# Patient Record
Sex: MALE | Race: WHITE | Employment: STUDENT | URBAN - METROPOLITAN AREA
[De-identification: names, ages, dates, MRNs, and addresses within clinical notes are randomized per-mention and may not be internally consistent; named-entity substitution may affect disease eponyms.]

---

## 2017-04-20 DIAGNOSIS — K50.811 CROHN'S DISEASE OF BOTH SMALL AND LARGE INTESTINE WITH RECTAL BLEEDING (H): ICD-10-CM

## 2017-04-20 NOTE — TELEPHONE ENCOUNTER
mesalamine      Last Written Prescription Date:  9-13-16  Last Fill Quantity: 240,   # refills: 3  Last Office Visit : 9-13-16  Future Office visit:  none    Creatinine   Date Value Ref Range Status   07/08/2013 1.03 (H) 0.50 - 1.00 mg/dL Final   ]    Kathleen M Doege RN

## 2017-04-21 RX ORDER — MESALAMINE 500 MG/1
2000 CAPSULE, EXTENDED RELEASE ORAL 2 TIMES DAILY
Qty: 240 CAPSULE | Refills: 3 | Status: SHIPPED | OUTPATIENT
Start: 2017-04-21 | End: 2018-09-05

## 2017-07-07 ENCOUNTER — CARE COORDINATION (OUTPATIENT)
Dept: GASTROENTEROLOGY | Facility: CLINIC | Age: 22
End: 2017-07-07

## 2017-07-07 NOTE — PROGRESS NOTES
Called pt in response from in basket message from call center.  Called to discuss symptoms. Pt has not been seen since September 2016.  Pt cancelled his appt in December due to college finals.  Said he has had flares. When asked to describe flares,  Said last one was a couple of weeks ago.  Diarrhea, mucus and blood noted around the stools.  Taking pentasa as prescribed.  Started takingr rowasa last week. Dr. Whipple had a last minute cancellation for Monday.  Offered the pt appt on Monday.  Pt in agreement.       followup with Dr. Whipple but does not want to wait until October / next available to be seen.     PT is requesting a call to discuss scheduling options.   PT can be reached at [phone]

## 2017-07-09 ASSESSMENT — ENCOUNTER SYMPTOMS
CONSTIPATION: 0
BLOOD IN STOOL: 1
HEARTBURN: 1
JAUNDICE: 0
RECTAL PAIN: 0
NAUSEA: 0
DIARRHEA: 1
BOWEL INCONTINENCE: 0
RECTAL BLEEDING: 0
BLOATING: 0
VOMITING: 0
ABDOMINAL PAIN: 1

## 2017-07-10 ENCOUNTER — TELEPHONE (OUTPATIENT)
Dept: GASTROENTEROLOGY | Facility: CLINIC | Age: 22
End: 2017-07-10

## 2017-07-10 ENCOUNTER — OFFICE VISIT (OUTPATIENT)
Dept: GASTROENTEROLOGY | Facility: CLINIC | Age: 22
End: 2017-07-10

## 2017-07-10 VITALS
BODY MASS INDEX: 24.82 KG/M2 | SYSTOLIC BLOOD PRESSURE: 128 MMHG | WEIGHT: 167.6 LBS | HEART RATE: 87 BPM | DIASTOLIC BLOOD PRESSURE: 71 MMHG | OXYGEN SATURATION: 98 % | HEIGHT: 69 IN | TEMPERATURE: 98.2 F

## 2017-07-10 DIAGNOSIS — K50.811 CROHN'S DISEASE OF BOTH SMALL AND LARGE INTESTINE WITH RECTAL BLEEDING (H): Primary | ICD-10-CM

## 2017-07-10 ASSESSMENT — PAIN SCALES - GENERAL: PAINLEVEL: NO PAIN (0)

## 2017-07-10 NOTE — MR AVS SNAPSHOT
After Visit Summary   7/10/2017    Ferdinand Anglin    MRN: [id number]           Patient Information     Date Of Birth          1995        Visit Information        Provider Department      7/10/2017 11:20 AM Estrada Whipple MD [facility] Gastroenterology and IBD        Today's Diagnoses     Crohn's disease of both small and large intestine with rectal bleeding (H)    -  1      Care Instructions    Good to see you again    Get labs  Lab tests today at the 1st floor -- you will be notified of results by letter or my chart message in 7-10 days.  You will receive a phone call if more urgent follow up is needed.      Schedule flexible sigmoidoscopy without any sedation    You are scheduled  On   [facility]   [address] [place] [address]   Instructions will be sent out via email   July 19   Check in time is 1 pm  You will be contacted by the pre assessment nurse     Continue the Pentasa and rowasa enemas      Follow up in 2-3 months Ari Anders  October 6 th   Check in 745   Ari Anders  4th floor   Surgery Clinic   [address] [facility].     For questions regarding your care Monday through Friday, contact the RN GI care coordinator,  Call Maude Nathan at  [phone] option 3 and leave a voicemail. Your call will be  returned same day, or if consultation is needed with the provider, it may be following business day - or you may send a My Chart message.    For medication refills (prescribed by the GI clinic), contact your pharmacy.    For appointment rescheduling/cancellation, contact [phone]     After hours, or if you have an immediate GI concern and cannot wait for a return call, contact the GI Fellow at [phone] and select option #4.              Follow-ups after your visit        Additional Services     GASTROENTEROLOGY ADULT REF PROCEDURE ONLY       Last Lab Result: Creatinine (mg/dL)       Date                     Value                 07/08/2013               1.03 (H)          ----------  Body mass index is 24.54 kg/(m^2).     Needed:  No  Language:  English    Patient will be contacted to schedule procedure.     Please be aware that coverage of these services is subject to the terms and limitations of your health insurance plan.  Call member services at your health plan with any benefit or coverage questions.  Any procedures must be performed at a [place] facility OR coordinated by your clinic's referral office.    Please bring the following with you to your appointment:    (1) Any X-Rays, CTs or MRIs which have been performed.  Contact the facility where they were done to arrange for  prior to your scheduled appointment.    (2) List of current medications   (3) This referral request   (4) Any documents/labs given to you for this referral                  Your next 10 appointments already scheduled     Jul 19, 2017  2:00 PM CDT   Flexible Sigmoidoscopy with Estrada Whipple MD   [facility]    [address] [place], [place] [id number] [phone]              Future tests that were ordered for you today     Open Future Orders        Priority Expected Expires Ordered    Thiopurine Methyltransferase RBC [DRS6875] Routine 7/10/2017 9/8/2017 7/10/2017    CBC with platelets differential [OMJ345] Routine 7/10/2017 9/8/2017 7/10/2017    Hepatic panel [LAB20] Routine 7/10/2017 9/8/2017 7/10/2017    CRP inflammation [HEN8950] Routine 7/10/2017 9/8/2017 7/10/2017    Erythrocyte sedimentation rate auto [JMZ934] Routine 7/10/2017 9/8/2017 7/10/2017    Basic metabolic panel [LAB15] Routine 7/10/2017 9/8/2017 7/10/2017            Who to contact     Please call your clinic at [phone] to:    Ask questions about your health    Make or cancel appointments    Discuss your medicines    Learn about your test results    Speak to your doctor   If you have compliments or concerns about an experience at your clinic,  "or if you wish to file a complaint, please contact [facility] Patient Relations at [phone] or email us at [email or web address]         Additional Information About Your Visit        CEYXharAyudarum Information     Quick TV gives you secure access to your electronic health record. If you see a primary care provider, you can also send messages to your care team and make appointments. If you have questions, please call your primary care clinic.  If you do not have a primary care provider, please call [phone] and they will assist you.      Quick TV is an electronic gateway that provides easy, online access to your medical records. With Quick TV, you can request a clinic appointment, read your test results, renew a prescription or communicate with your care team.     To access your existing account, please contact your [facility] Clinic or call [phone] for assistance.        Care EveryWhere ID     This is your Care EveryWhere ID. This could be used by other organizations to access your [facility] medical records  [id number]        Your Vitals Were     Pulse Temperature Height Pulse Oximetry BMI (Body Mass Index)       87 98.2  F (36.8  C) 1.76 m (5' 9.29\") 98% 24.54 kg/m2        Blood Pressure from Last 3 Encounters:   07/10/17 128/71   09/13/16 143/87   07/08/13 (!) 126/96    Weight from Last 3 Encounters:   07/10/17 76 kg (167 lb 9.6 oz)   10/14/16 74.8 kg (165 lb) (65 %)*   09/13/16 75.4 kg (166 lb 3.2 oz) (67 %)*     * Growth percentiles are based on CDC 2-20 Years data.              We Performed the Following     GASTROENTEROLOGY ADULT REF PROCEDURE ONLY        Primary Care Provider Office Phone # Fax #    Sandi Holloway [phone]       [facility]  [address] [place] [address]        Equal Access to Services     RUPERT DUNBAR AH: Hadii aad ku hadasho Soomaali, waaxda luqadaha, qaybta kaalmada " denise santosozzyferny virgen'aan ah. So [facility] [phone].    ATENCIÓN: Si katila brynn, tiene a roberto disposición servicios gratuitos de asistencia lingüística. Imelda al [phone].    We comply with applicable federal civil rights laws and [place] laws. We do not discriminate on the basis of race, color, national origin, age, disability sex, sexual orientation or gender identity.            Thank you!     Thank you for choosing [facility] GASTROENTEROLOGY AND IBD  for your care. Our goal is always to provide you with excellent care. Hearing back from our patients is one way we can continue to improve our services. Please take a few minutes to complete the written survey that you may receive in the mail after your visit with us. Thank you!             Your Updated Medication List - Protect others around you: Learn how to safely use, store and throw away your medicines at www.disposemymeds.org.          This list is accurate as of: 7/10/17 12:26 PM.  Always use your most recent med list.                   Brand Name Dispense Instructions for use Diagnosis    calcium carbonate 1250 MG tablet    OS-HARLAN 500 mg [place]. [place]     Take 2,000 mg by mouth 2 times daily        ENSURE COMPLETE SHAKE PO      Take  by mouth.        mesalamine 500 MG Cpcr CR capsule    PENTASA    240 capsule    Take 4 capsules (2,000 mg) by mouth 2 times daily    Crohn's disease of both small and large intestine with rectal bleeding (H)       ROWASA 4 G Kit     30 kit    4 grams in 60 mL once daily    Crohn's disease of both small and large intestine with rectal bleeding (H)       vitamin D 2000 UNITS Caps      Take 50,000 Units by mouth

## 2017-07-10 NOTE — NURSING NOTE
"Chief Complaint   Patient presents with     RECHECK     f/u       Vitals:    07/10/17 1135   BP: 128/71   BP Location: Left arm   Patient Position: Chair   Cuff Size: Adult Large   Pulse: 87   Temp: 98.2  F (36.8  C)   SpO2: 98%   Weight: 167 lb 9.6 oz   Height: 5' 9.29\"       Body mass index is 24.54 kg/(m^2).      Myla Anthony MA                          "

## 2017-07-10 NOTE — NURSING NOTE
Printed after visit summary given to pt along with verbal instructions.  Flex sig scheduled.  Follow up appt with Ari Hodges.

## 2017-07-10 NOTE — PATIENT INSTRUCTIONS
Good to see you again    Get labs  Lab tests today at the 1st floor -- you will be notified of results by letter or my chart message in 7-10 days.  You will receive a phone call if more urgent follow up is needed.      Schedule flexible sigmoidoscopy without any sedation    You are scheduled  On   [facility]   [address] [facility]   Instructions will be sent out via email   July 19   Check in time is 1 pm  You will be contacted by the pre assessment nurse     Continue the Pentasa and rowasa enemas      Follow up in 2-3 months Ari Anders  October 6 th   Check in 745   Ari Anders  4th floor   Surgery Clinic   [address] [facility].     For questions regarding your care Monday through Friday, contact the RN GI care coordinator,  Call Maude Nathan at  [phone] option 3 and leave a voicemail. Your call will be  returned same day, or if consultation is needed with the provider, it may be following business day - or you may send a My Chart message.    For medication refills (prescribed by the GI clinic), contact your pharmacy.    For appointment rescheduling/cancellation, contact [phone]     After hours, or if you have an immediate GI concern and cannot wait for a return call, contact the GI Fellow at [phone] and select option #4.

## 2017-07-10 NOTE — PROGRESS NOTES
OUTPATIENT GI CONSULT NOTE       REASON FOR VISIT:  Crohn's disease.       REFERRING PROVIDER:  Sandi Holloway MD.       CHIEF COMPLAINT:  Bloody diarrhea.       IBD HISTORY:   1.  Age at diagnosis:  He was diagnosed at age 8.   2.  Extent of disease:  Reports indicate that he had gastritis, as well as some duodenitis with granulomas in the duodenum.  He also had some ileitis and colitis.  The exact extent of involvement of the colon on diagnosis is not available, although his most recent colonoscopy in  showed some inflammation in the rectosigmoid. Follow up EGD showed no granulomas  3.  Disease phenotype:  Inflammatory.   4.  Perianal disease:  None known.   5.  Current Crohn's disease medications:  Pentasa 2000 mg PO BID. Rowasa enemas nightly.   6.  Prior IBD surgeries:  None.   7.  Prior IBD medications:  He has been on Pentasa in the past, anywhere from 500 b.i.d. to 2000 mg p.o. b.i.d.  He has been on-and-off prednisone over the years.  He recently tried Entocort with no improvement in his symptoms.  He had been on Humira in the past with some question of improvement; however, he and his family all deny that the Humira helped him at all.  That was back in .       Drug monitorin.  TPMT:  22.7 (WNL).   2.  6TGN/6MMPN levels:  None available.   3.  Biologic concentrations:  None available.       Today:  -doing ok. Did not follow up in December due to finals.  -still with significant anxiety about scopes. Thinks he may be able to try unsedated. He is nervous about anesthesia not the scope itself. Wants to think more about it. But is willing to do flex sig    -gets flare in symptoms every few weeks. When doing well has 2 formed BMs daily. With flare will get 6 BMs per day and eventually some blood. +Gas. Can always hold it. Not really urgent. Associated abd cramping. No accidents. No nocturnal stools.    -No EIM      REVIEW OF SYSTEMS:  A complete review of systems is performed.  Pertinent positives  "and negatives are as stated above in the HPI.  The remainder of a complete review of systems is unremarkable.       PAST MEDICAL HISTORY:  Crohn's disease as outlined above.       MEDICATIONS:   Current Outpatient Prescriptions   Medication     mesalamine (PENTASA) 500 MG CPCR CR capsule     calcium carbonate (OS-HARLAN 500 MG [place]. [place]) 500 MG tablet     Mesalamine-Cleanser (ROWASA) 4 G KIT     Cholecalciferol (VITAMIN D) 2000 UNITS CAPS     Nutritional Supplements (ENSURE COMPLETE SHAKE PO)     No current facility-administered medications for this visit.          SOCIAL HISTORY:  Reviewd. He does not smoke tobacco.  He has minimal use of marijuana, but none currently.  No alcohol use.  He is a sophomore in college, and he is a computer science and math major.  He is originally from [place], and his parents are with him today.  They all speak very good English.       PAST SURGICAL HISTORY:  He has had his adenoids taken out.  No abdominal surgeries.       FAMILY HISTORY:    -Father had a history of IBS and Parkinson's disease.    -Maternal grandmother was 80 when she had colon polyps.    -His maternal uncle also had colon cancer at the age of 54.       PHYSICAL EXAMINATION:   /71 (BP Location: Left arm, Patient Position: Chair, Cuff Size: Adult Large)  Pulse 87  Temp 98.2  F (36.8  C)  Ht 1.76 m (5' 9.29\")  Wt 76 kg (167 lb 9.6 oz)  SpO2 98%  BMI 24.54 kg/m2  GENERAL:  He is pleasant, in no acute distress.   HEENT:  Head is atraumatic, normocephalic.  Sclerae are anicteric without injection.  Oropharynx is clear with moist mucous membranes.   NECK:  Supple with no lymphadenopathy, no thyromegaly.   LUNGS:  Breathing comfortably  HEART:  Normal rate and rhythm.   ABDOMEN:  Soft, nontender and nondistended.   EXTREMITIES:  No clubbing, cyanosis or edema.   SKIN:  No evidence of rash.   JOINTS:  No evidence of synovitis.   NEUROLOGIC:  Awake, alert and oriented x3 with no focal deficits.       LABORATORY DATA: "    -labs reviewed from 9/2016. Leukocytosis on steroids. ALT at 56. Prot 9. Alb 4.3. Iron studies ok. Folate could not be run. B12 high. Hgb 19.2. Plt 372.    MRE  - no abnormalities    Immune to hep B      ASSESSMENT:  Mr. Anglin is a 21-year-old gentleman with a history of both upper and lower GI Crohn's disease, who is here today to establish care.      1.  Inflammatory bowel disease. Again we discussed need for endoscopic evaluation (EGD and full colonoscopy). Most recent colonoscopy 2007 showed recto-sigmoid inflammation which could be suggestive of UC but reportedly had granulomas in upper GI tract and had some ileal inflammation many years ago that would suggest Crohn's. MRE unremarkable. We need to re-stage disease to clarify diagnosis and determine if there is need for escalation of therapy. He is at least willing to do an unsedated flexible sigmoidoscopy. This is not ideal as I do want to evaluate the entire colon, the ileum and the upper GI tract but at least it will give us some evaluation of the mucosa. Depending on what we find we will decide if we need to push harder for more endoscopic evaluation vs escalation of therapy. We did discuss option of trying procedures without sedation and he will consider.   -flex sig  -labs today  -continue Pentasa 2000 mg BID and Rowasa enemas nightly for now  -follow up in 2-3 months with PA       2. Health care maintenance.  This was not discussed in depth with the patient.    -calcium/vitamin D daily. Will consider DEXA in the future  -recommend he be UTD on all age appropriate vaccines  -ok to get live vaccines for now if needed  -recommend yearly flu shot  -He should avoid tobacco and NSAIDs.    -We can discuss further need for skin checks in the future if he is ever put on maintenance immunosuppression      Thank you very much for the opportunity to take part in the care of this patient.  Please do not hesitate to call with questions.     Estrada Whipple,  MD    [facility]  Division of Gastroenterology, Hepatology and Nutrition    Answers for HPI/ROS submitted by the patient on 7/9/2017   General Symptoms: No  Skin Symptoms: No  HENT Symptoms: No  EYE SYMPTOMS: No  HEART SYMPTOMS: No  LUNG SYMPTOMS: No  INTESTINAL SYMPTOMS: Yes  URINARY SYMPTOMS: No  REPRODUCTIVE SYMPTOMS: No  SKELETAL SYMPTOMS: No  BLOOD SYMPTOMS: No  NERVOUS SYSTEM SYMPTOMS: No  MENTAL HEALTH SYMPTOMS: No  Heart burn or indigestion: Yes  Nausea: No  Vomiting: No  Abdominal pain: Yes  Bloating: No  Constipation: No  Diarrhea: Yes  Blood in stool: Yes  Black stools: No  Rectal or Anal pain: No  Fecal incontinence: No  Rectal bleeding: No  Yellowing of skin or eyes: No  Vomit with blood: No  Change in stools: Yes  Hemorrhoids: Yes

## 2017-07-10 NOTE — LETTER
7/10/2017       RE: Ferdinand Anglin  [address] [place] [address]     Dear Colleague,    Thank you for referring your patient, Ferdinand Anglin, to the [facility] GASTROENTEROLOGY AND IBD at [facility]. Please see a copy of my visit note below.    OUTPATIENT GI CONSULT NOTE       REASON FOR VISIT:  Crohn's disease.       REFERRING PROVIDER:  Sandi Holloway MD.       CHIEF COMPLAINT:  Bloody diarrhea.       IBD HISTORY:   1.  Age at diagnosis:  He was diagnosed at age 8.   2.  Extent of disease:  Reports indicate that he had gastritis, as well as some duodenitis with granulomas in the duodenum.  He also had some ileitis and colitis.  The exact extent of involvement of the colon on diagnosis is not available, although his most recent colonoscopy in  showed some inflammation in the rectosigmoid. Follow up EGD showed no granulomas  3.  Disease phenotype:  Inflammatory.   4.  Perianal disease:  None known.   5.  Current Crohn's disease medications:   Pentasa 2000 mg PO BID. Rowasa enemas nightly.   6.  Prior IBD surgeries:  None.   7.  Prior IBD medications:  He has been on Pentasa in the past, anywhere from 500 b.i.d. to 2000 mg p.o. b.i.d.  He has been on-and-off prednisone over the years.  He recently tried Entocort with no improvement in his symptoms.  He had been on Humira in the past with some question of improvement; however, he and his family all deny that the Humira helped him at all.  That was back in .       Drug monitorin.  TPMT:    22.7 (WNL).   2.  6TGN/6MMPN levels:  None available.   3.  Biologic concentrations:  None available.       Today:  -doing ok. Did not follow up in December due to finals.  -still with significant anxiety about scopes. Thinks he may be able to try unsedated. He is nervous about anesthesia not the scope itself. Wants to think more about it. But is willing to do flex sig    -gets flare in symptoms every few weeks. When  "doing well has 2 formed BMs daily. With flare will get 6 BMs per day and eventually some blood. +Gas. Can always hold it. Not really urgent. Associated abd cramping. No accidents. No nocturnal stools.    -No EIM      REVIEW OF SYSTEMS:  A complete review of systems is performed.  Pertinent positives and negatives are as stated above in the HPI.  The remainder of a complete review of systems is unremarkable.       PAST MEDICAL HISTORY:  Crohn's disease as outlined above.       MEDICATIONS:   Current Outpatient Prescriptions   Medication     mesalamine (PENTASA) 500 MG CPCR CR capsule     calcium carbonate (OS-HARLAN 500 MG [place]. [place]) 500 MG tablet     Mesalamine-Cleanser (ROWASA) 4 G KIT     Cholecalciferol (VITAMIN D) 2000 UNITS CAPS     Nutritional Supplements (ENSURE COMPLETE SHAKE PO)     No current facility-administered medications for this visit.      SOCIAL HISTORY:   Reviewd. He does not smoke tobacco.  He has minimal use of marijuana, but none currently.  No alcohol use.  He is a sophomore in college, and he is a computer science and math major.  He is originally from [place], and his parents are with him today.  They all speak very good English.       PAST SURGICAL HISTORY:  He has had his adenoids taken out.  No abdominal surgeries.       FAMILY HISTORY:    -Father had a history of IBS and Parkinson's disease.    -Maternal grandmother was 80 when she had colon polyps.    -His maternal uncle also had colon cancer at the age of 54.       PHYSICAL EXAMINATION:   /71 (BP Location: Left arm, Patient Position: Chair, Cuff Size: Adult Large)  Pulse 87  Temp 98.2  F (36.8  C)  Ht 1.76 m (5' 9.29\")  Wt 76 kg (167 lb 9.6 oz)  SpO2 98%  BMI 24.54 kg/m2  GENERAL:  He is pleasant, in no acute distress.   HEENT:  Head is atraumatic, normocephalic.  Sclerae are anicteric without injection.  Oropharynx is clear with moist mucous membranes.   NECK:  Supple with no lymphadenopathy, no thyromegaly.   LUNGS:   " Breathing comfortably  HEART:  Normal rate and rhythm.   ABDOMEN:  Soft, nontender and nondistended.   EXTREMITIES:  No clubbing, cyanosis or edema.   SKIN:  No evidence of rash.   JOINTS:  No evidence of synovitis.   NEUROLOGIC:  Awake, alert and oriented x3 with no focal deficits.       LABORATORY DATA:    -labs reviewed from 9/2016. Leukocytosis on steroids. ALT at 56. Prot 9. Alb 4.3. Iron studies ok. Folate could not be run. B12 high. Hgb 19.2. Plt 372.    MRE  - no abnormalities    Immune to hep B      ASSESSMENT:  Mr. Anglin is a 21-year-old gentleman with a history of both upper and lower GI Crohn's disease, who is here today to establish care.      1.  Inflammatory bowel disease. Again we discussed need for endoscopic evaluation (EGD and full colonoscopy). Most recent colonoscopy 2007 showed recto-sigmoid inflammation which could be suggestive of UC but reportedly had granulomas in upper GI tract and had some ileal inflammation many years ago that would suggest Crohn's. MRE unremarkable. We need to re-stage disease to clarify diagnosis and determine if there is need for escalation of therapy. He is at least willing to do an unsedated flexible sigmoidoscopy. This is not ideal as I do want to evaluate the entire colon, the ileum and the upper GI tract but at least it will give us some evaluation of the mucosa. Depending on what we find we will decide if we need to push harder for more endoscopic evaluation vs escalation of therapy. We did discuss option of trying procedures without sedation and he will consider.   -flex sig  -labs today  -continue Pentasa 2000 mg BID and Rowasa enemas nightly for now  -follow up in 2-3 months with PA       2. Health care maintenance.  This was not discussed in depth with the patient.    -calcium/vitamin D daily. Will consider DEXA in the future  -recommend he be UTD on all age appropriate vaccines  -ok to get live vaccines for now if needed  -recommend yearly flu shot  -He  should avoid tobacco and NSAIDs.    -We can discuss further need for skin checks in the future if he is ever put on maintenance immunosuppression      Thank you very much for the opportunity to take part in the care of this patient.  Please do not hesitate to call with questions.     Again, thank you for allowing me to participate in the care of your patient.      Sincerely,    Estrada Whipple MD

## 2017-07-12 ENCOUNTER — TELEPHONE (OUTPATIENT)
Dept: GASTROENTEROLOGY | Facility: OUTPATIENT CENTER | Age: 22
End: 2017-07-12

## 2017-07-12 DIAGNOSIS — K50.811 CROHN'S DISEASE OF BOTH SMALL AND LARGE INTESTINE WITH RECTAL BLEEDING (H): ICD-10-CM

## 2017-07-12 LAB
BASOPHILS # BLD AUTO: 0 10E9/L (ref 0–0.2)
BASOPHILS NFR BLD AUTO: 0.2 %
CRP SERPL-MCNC: <2.9 MG/L (ref 0–8)
DIFFERENTIAL METHOD BLD: NORMAL
EOSINOPHIL # BLD AUTO: 0.2 10E9/L (ref 0–0.7)
EOSINOPHIL NFR BLD AUTO: 3.6 %
ERYTHROCYTE [DISTWIDTH] IN BLOOD BY AUTOMATED COUNT: 12.3 % (ref 10–15)
ERYTHROCYTE [SEDIMENTATION RATE] IN BLOOD BY WESTERGREN METHOD: 7 MM/H (ref 0–15)
HCT VFR BLD AUTO: 50.7 % (ref 40–53)
HGB BLD-MCNC: 17.5 G/DL (ref 13.3–17.7)
LYMPHOCYTES # BLD AUTO: 2.4 10E9/L (ref 0.8–5.3)
LYMPHOCYTES NFR BLD AUTO: 38.6 %
MCH RBC QN AUTO: 31.8 PG (ref 26.5–33)
MCHC RBC AUTO-ENTMCNC: 34.5 G/DL (ref 31.5–36.5)
MCV RBC AUTO: 92 FL (ref 78–100)
MONOCYTES # BLD AUTO: 0.4 10E9/L (ref 0–1.3)
MONOCYTES NFR BLD AUTO: 6.9 %
NEUTROPHILS # BLD AUTO: 3.1 10E9/L (ref 1.6–8.3)
NEUTROPHILS NFR BLD AUTO: 50.7 %
PLATELET # BLD AUTO: 315 10E9/L (ref 150–450)
RBC # BLD AUTO: 5.51 10E12/L (ref 4.4–5.9)
WBC # BLD AUTO: 6.2 10E9/L (ref 4–11)

## 2017-07-12 PROCEDURE — 99000 SPECIMEN HANDLING OFFICE-LAB: CPT | Performed by: FAMILY MEDICINE

## 2017-07-12 PROCEDURE — 80048 BASIC METABOLIC PNL TOTAL CA: CPT | Performed by: FAMILY MEDICINE

## 2017-07-12 PROCEDURE — 85025 COMPLETE CBC W/AUTO DIFF WBC: CPT | Performed by: FAMILY MEDICINE

## 2017-07-12 PROCEDURE — 85652 RBC SED RATE AUTOMATED: CPT | Performed by: FAMILY MEDICINE

## 2017-07-12 PROCEDURE — 82657 ENZYME CELL ACTIVITY: CPT | Mod: 90 | Performed by: FAMILY MEDICINE

## 2017-07-12 PROCEDURE — 86140 C-REACTIVE PROTEIN: CPT | Performed by: FAMILY MEDICINE

## 2017-07-12 PROCEDURE — 36415 COLL VENOUS BLD VENIPUNCTURE: CPT | Performed by: FAMILY MEDICINE

## 2017-07-12 PROCEDURE — 80076 HEPATIC FUNCTION PANEL: CPT | Performed by: FAMILY MEDICINE

## 2017-07-12 NOTE — TELEPHONE ENCOUNTER
Patient taking any blood thinners ? no    Heart disease ? denies    Lung disease ? denies      Sleep apnea ? denies    Diabetic ? denies    Kidney disease ? denies    Dialysis ? n/a    Electronic implanted medical devices ? denies    Are you taking any narcotic pain medication ? no  What is your daily dosage ?    PTSD ? n/a    Prep instructions reviewed with patient ? Instructions reviewed. patient states he is not having sedation.    Pharmacy : n/a    Indication for procedure : IBS    Referring provider : Dr. Estrada Whipple

## 2017-07-13 LAB
ALBUMIN SERPL-MCNC: 4.2 G/DL (ref 3.4–5)
ALP SERPL-CCNC: 88 U/L (ref 40–150)
ALT SERPL W P-5'-P-CCNC: 29 U/L (ref 0–70)
ANION GAP SERPL CALCULATED.3IONS-SCNC: 9 MMOL/L (ref 3–14)
AST SERPL W P-5'-P-CCNC: 15 U/L (ref 0–45)
BILIRUB DIRECT SERPL-MCNC: 0.1 MG/DL (ref 0–0.2)
BILIRUB SERPL-MCNC: 0.7 MG/DL (ref 0.2–1.3)
BUN SERPL-MCNC: 18 MG/DL (ref 7–30)
CALCIUM SERPL-MCNC: 9.8 MG/DL (ref 8.5–10.1)
CHLORIDE SERPL-SCNC: 104 MMOL/L (ref 94–109)
CO2 SERPL-SCNC: 28 MMOL/L (ref 20–32)
CREAT SERPL-MCNC: 1.1 MG/DL (ref 0.66–1.25)
GFR SERPL CREATININE-BSD FRML MDRD: 85 ML/MIN/1.7M2
GLUCOSE SERPL-MCNC: 116 MG/DL (ref 70–99)
POTASSIUM SERPL-SCNC: 3.9 MMOL/L (ref 3.4–5.3)
PROT SERPL-MCNC: 8.1 G/DL (ref 6.8–8.8)
SODIUM SERPL-SCNC: 141 MMOL/L (ref 133–144)

## 2017-07-15 ENCOUNTER — HEALTH MAINTENANCE LETTER (OUTPATIENT)
Age: 22
End: 2017-07-15

## 2017-07-16 LAB — TPMT BLD-CCNC: 25.4 U/ML

## 2017-07-19 ENCOUNTER — DOCUMENTATION ONLY (OUTPATIENT)
Dept: GASTROENTEROLOGY | Facility: OUTPATIENT CENTER | Age: 22
End: 2017-07-19

## 2017-07-19 ENCOUNTER — TRANSFERRED RECORDS (OUTPATIENT)
Dept: HEALTH INFORMATION MANAGEMENT | Facility: CLINIC | Age: 22
End: 2017-07-19

## 2017-07-19 DIAGNOSIS — K50.811 CROHN'S DISEASE OF BOTH SMALL AND LARGE INTESTINE WITH RECTAL BLEEDING (H): ICD-10-CM

## 2017-07-19 DIAGNOSIS — K52.3 INDETERMINATE COLITIS: Primary | ICD-10-CM

## 2017-07-19 RX ORDER — MESALAMINE 4 G/60ML
SUSPENSION RECTAL
Qty: 90 KIT | Refills: 1 | Status: CANCELLED | OUTPATIENT
Start: 2017-07-19

## 2017-07-19 RX ORDER — MESALAMINE 4 G/60ML
4 SUSPENSION RECTAL AT BEDTIME
Qty: 30 ENEMA | Refills: 11 | Status: SHIPPED | OUTPATIENT
Start: 2017-07-19 | End: 2018-08-15

## 2017-07-19 RX ORDER — HYDROCORTISONE 100 MG/60ML
100 SUSPENSION RECTAL EVERY MORNING
Qty: 21 ENEMA | Refills: 0 | Status: SHIPPED | OUTPATIENT
Start: 2017-07-19 | End: 2017-08-09

## 2017-07-19 NOTE — TELEPHONE ENCOUNTER
Mesalamine enema  Last Written Prescription Date:  9/13/16  Last Fill Quantity: 30 kit,   # refills: 3  Last Office Visit : 7/10/17  Future Office visit:  10/6/17     Lab Results   Component Value Date    CR 1.10 07/12/2017       Routing refill request to clinic RN for review/approval because:  Not on GI standing order protocol

## 2017-07-21 LAB — COPATH REPORT: NORMAL

## 2018-08-09 DIAGNOSIS — K50.811 CROHN'S DISEASE OF BOTH SMALL AND LARGE INTESTINE WITH RECTAL BLEEDING (H): ICD-10-CM

## 2018-08-09 DIAGNOSIS — K52.3 INDETERMINATE COLITIS: Primary | ICD-10-CM

## 2018-08-09 NOTE — TELEPHONE ENCOUNTER
hydrocortisone (CORTENEMA) 100 MG/60ML enema  Last Written Prescription Date: 7/19/17  Last Fill Quantity: 21,   # refills: 0  Last Office Visit : 7/10/17  Future Office visit: none       Mesalamine  (ROWASA) 4 G enema  Last Written Prescription Date: 7/19/17  Last Fill Quantity: 30 enema,   # refills: 11    Scheduling has been notified to contact the pt for appointment.      Routing  Because: hydrocortisone (CORTENEMA)   med end date 8/9/17    past due for appt    Mesalamine enema not on protocol.

## 2018-08-10 ENCOUNTER — TELEPHONE (OUTPATIENT)
Dept: GASTROENTEROLOGY | Facility: CLINIC | Age: 23
End: 2018-08-10

## 2018-08-10 DIAGNOSIS — K50.811 CROHN'S DISEASE OF BOTH SMALL AND LARGE INTESTINE WITH RECTAL BLEEDING (H): ICD-10-CM

## 2018-08-10 NOTE — TELEPHONE ENCOUNTER
[facility] Call Center    Phone Message    May a detailed message be left on voicemail: yes    Reason for Call: Other: Mother is calling to check on this patient RX request that was sent over yesterday, she would like to know if there is anyway possible for it to be done today? I did remind her it generally is a 72 business hour turn around. Please call to follow up      Action Taken: Message routed to:  [facility] ([facility]): FUAD

## 2018-08-15 RX ORDER — MESALAMINE 4 G/60ML
4 SUSPENSION RECTAL AT BEDTIME
Qty: 30 ENEMA | Refills: 0 | Status: SHIPPED | OUTPATIENT
Start: 2018-08-15 | End: 2018-09-11

## 2018-08-15 NOTE — TELEPHONE ENCOUNTER
Discussed with pt that he has not been seen for over one year. One refill and no more refills until seen in the clinic.  Sent my chart message for pt with information on how to schedule follow up appt.   You be scheduled with Dr. Whipple or our physician assistant Ms. De.   Thanks Maude Nathan RN Care Coordinator for Dr. Whipple   Phone   [phone]

## 2018-08-16 RX ORDER — HYDROCORTISONE 100 MG/60ML
100 SUSPENSION RECTAL AT BEDTIME
Qty: 21 ENEMA | Refills: 0 | Status: SHIPPED | OUTPATIENT
Start: 2018-08-16 | End: 2018-09-06

## 2018-08-16 RX ORDER — MESALAMINE 4 G/60ML
4 SUSPENSION RECTAL AT BEDTIME
Qty: 30 ENEMA | Refills: 11 | OUTPATIENT
Start: 2018-08-16

## 2018-09-03 ASSESSMENT — ENCOUNTER SYMPTOMS
CHILLS: 0
RECTAL PAIN: 0
VOMITING: 0
FATIGUE: 1
POLYPHAGIA: 0
CONSTIPATION: 0
INCREASED ENERGY: 1
BOWEL INCONTINENCE: 0
WEIGHT LOSS: 0
DECREASED APPETITE: 0
HALLUCINATIONS: 0
FEVER: 0
NAUSEA: 0
ALTERED TEMPERATURE REGULATION: 0
NIGHT SWEATS: 0
HEARTBURN: 0
POLYDIPSIA: 0
JAUNDICE: 0
BLOOD IN STOOL: 1
BLOATING: 0
WEIGHT GAIN: 0
DIARRHEA: 1
ABDOMINAL PAIN: 1

## 2018-09-04 ENCOUNTER — TELEPHONE (OUTPATIENT)
Dept: GASTROENTEROLOGY | Facility: CLINIC | Age: 23
End: 2018-09-04

## 2018-09-05 ENCOUNTER — OFFICE VISIT (OUTPATIENT)
Dept: GASTROENTEROLOGY | Facility: CLINIC | Age: 23
End: 2018-09-05
Payer: COMMERCIAL

## 2018-09-05 ENCOUNTER — TELEPHONE (OUTPATIENT)
Dept: GASTROENTEROLOGY | Facility: CLINIC | Age: 23
End: 2018-09-05

## 2018-09-05 VITALS
SYSTOLIC BLOOD PRESSURE: 134 MMHG | BODY MASS INDEX: 25.1 KG/M2 | HEIGHT: 69 IN | OXYGEN SATURATION: 99 % | DIASTOLIC BLOOD PRESSURE: 78 MMHG | WEIGHT: 169.5 LBS | HEART RATE: 85 BPM | TEMPERATURE: 98.2 F

## 2018-09-05 DIAGNOSIS — K50.811 CROHN'S DISEASE OF BOTH SMALL AND LARGE INTESTINE WITH RECTAL BLEEDING (H): ICD-10-CM

## 2018-09-05 DIAGNOSIS — K50.811 CROHN'S DISEASE OF BOTH SMALL AND LARGE INTESTINE WITH RECTAL BLEEDING (H): Primary | ICD-10-CM

## 2018-09-05 LAB
BASOPHILS # BLD AUTO: 0 10E9/L (ref 0–0.2)
BASOPHILS NFR BLD AUTO: 0.1 %
CRP SERPL-MCNC: 23 MG/L (ref 0–8)
DIFFERENTIAL METHOD BLD: ABNORMAL
EOSINOPHIL # BLD AUTO: 0.2 10E9/L (ref 0–0.7)
EOSINOPHIL NFR BLD AUTO: 1.4 %
ERYTHROCYTE [DISTWIDTH] IN BLOOD BY AUTOMATED COUNT: 12.1 % (ref 10–15)
ERYTHROCYTE [SEDIMENTATION RATE] IN BLOOD BY WESTERGREN METHOD: 12 MM/H (ref 0–15)
HCT VFR BLD AUTO: 49.4 % (ref 40–53)
HGB BLD-MCNC: 16.5 G/DL (ref 13.3–17.7)
LYMPHOCYTES # BLD AUTO: 3.5 10E9/L (ref 0.8–5.3)
LYMPHOCYTES NFR BLD AUTO: 25 %
MCH RBC QN AUTO: 30.8 PG (ref 26.5–33)
MCHC RBC AUTO-ENTMCNC: 33.4 G/DL (ref 31.5–36.5)
MCV RBC AUTO: 92 FL (ref 78–100)
MONOCYTES # BLD AUTO: 0.9 10E9/L (ref 0–1.3)
MONOCYTES NFR BLD AUTO: 6.6 %
NEUTROPHILS # BLD AUTO: 9.4 10E9/L (ref 1.6–8.3)
NEUTROPHILS NFR BLD AUTO: 66.9 %
PLATELET # BLD AUTO: 358 10E9/L (ref 150–450)
RBC # BLD AUTO: 5.35 10E12/L (ref 4.4–5.9)
WBC # BLD AUTO: 14 10E9/L (ref 4–11)

## 2018-09-05 PROCEDURE — 80048 BASIC METABOLIC PNL TOTAL CA: CPT | Performed by: PHYSICIAN ASSISTANT

## 2018-09-05 PROCEDURE — 85652 RBC SED RATE AUTOMATED: CPT | Performed by: PHYSICIAN ASSISTANT

## 2018-09-05 PROCEDURE — 36415 COLL VENOUS BLD VENIPUNCTURE: CPT | Performed by: PHYSICIAN ASSISTANT

## 2018-09-05 PROCEDURE — 80076 HEPATIC FUNCTION PANEL: CPT | Performed by: PHYSICIAN ASSISTANT

## 2018-09-05 PROCEDURE — 85025 COMPLETE CBC W/AUTO DIFF WBC: CPT | Performed by: PHYSICIAN ASSISTANT

## 2018-09-05 PROCEDURE — 86140 C-REACTIVE PROTEIN: CPT | Performed by: PHYSICIAN ASSISTANT

## 2018-09-05 RX ORDER — MESALAMINE 500 MG/1
500 CAPSULE, EXTENDED RELEASE ORAL 4 TIMES DAILY
Qty: 240 CAPSULE | Refills: 3 | Status: SHIPPED | OUTPATIENT
Start: 2018-09-05 | End: 2018-11-14

## 2018-09-05 ASSESSMENT — PAIN SCALES - GENERAL: PAINLEVEL: MODERATE PAIN (4)

## 2018-09-05 NOTE — PROGRESS NOTES
IBD CLINIC VISIT     CC/REFERRING MD:  Sandi Holloway MD  REASON FOR FOLLOW UP: Crohn's disease  COLLABORATING PHYSICIAN: Estrada Whipple MD    CROHN'S HISTORY:  Age at diagnosis: age 8  Extent of disease: had gastritis and some duodenitis with granulomas in duodenum, also has had ileitis and colitis. Exact extent of involvement in colon on diagnosis not available, although colonoscopy 2007 showed some inflammation in rectosigmoid. Follow up EGD showed no granulomas   Disease phenotype: inflammatory  Isi-anal disease: None  Current CD medications: Rowasa enemas nightly, cortenemas nightly (for the last 2 weeks)  Prior IBD surgeries: None  Prior IBD Medications: He has been on Pentasa in the past, anywhere from 500 b.i.d. to 2000 mg p.o. b.i.d.  He has been on-and-off prednisone over the years.  He recently tried Entocort with no improvement in his symptoms.  He had been on Humira in the past with some question of improvement; however, he and his family all deny that the Humira helped him at all. That was back in 2013.     DRUG MONITORING  TPMT enzyme activity: 22.7, normal    6-TGN/6-MMPN levels: --    Biologic concentration:--    DISEASE ASSESSMENT  Labs:  Lab Results   Component Value Date    ALBUMIN 4.2 07/12/2017     Recent Labs   Lab Test  07/12/17   1316  09/16/16   1408   CRP  <2.9  <2.9   SED  7  8     Endoscopic assessment: flex sig 7/19/17 showed overall appearance of UC, Ibrahim 1-2 (at most) in rectum (distal 5-10cm oc colon) and mild erythema in sigmoid. Bx of polyp at splenic flexure shows TA with paneth cell metaplasia, other bx of descending, sigmoid and rectum show chronic changes, no active inflammation.  Enterography: 10/2016 MRE normal  Fecal calprotectin: --   C diff: --  ASSESSMENT/PLAN  Mr. Anglin is a 23 year old year old male here for Crohn's disease follow up, with symptoms concerning for a flare.    1. Crohn's disease (presumed ileocolonic): Symptoms over the last 6 weeks concerning for active  inflammation.  Patient is still very hesitant to move forward with any procedure requiring sedation.  He is open to a repeat flexible sigmoidoscopy to assess for active disease.  We again reviewed importance of a full colonoscopy for restaging in addition to his tubular adenoma that was found at the splenic flexure last flexible sigmoidoscopy in 2017.  Patient understands these risks of foregoing full surveillance colonoscopy, and would like to proceed with a flexible sigmoidoscopy without sedation.  There had been a plan to also proceed with an upper endoscopy with restaging, but again patient would like to hold off on this for now.  It is interesting that patient has been able to alter his trips to the bathroom while at school and that the number of bowel movements is dependent on his access to a bathroom.  We will need to objectively assess for any active inflammation, knowing that he has already started steroid enemas since symptoms have started, which could possibly give us a false sense of security.  It seems that enemas have been difficult for patient to tolerate with keeping the medication in, could consider budesonide foam in the future, although this does not allow for as much coverage, may provide some additional relief.  --Labs today to include CBC, LFTs, CRP, ESR  --Stool studies to be completed to assess for infection   -- Flexible sigmoidoscopy in the near future  -- Depending on findings of flexible sigmoidoscopy, need to determine a regimen that patient is willing to fully commit to and allows for maintenance and remission.    2. Depressed mood: Patient endorses both little interest doing things and feeling depressed over the last month.  This certainly could be associated with increased symptoms of his IBD.  I strongly encourage patient to follow up with our health psychologist and to establish care for further assessment in the setting of chronic disease.    3. IBD healthcare maintenance based on  patients current medication:  5-ASA   Vaccinations:  -- Influenza (every year): Recommend yearly  -- TdaP (every 10 years): Last given 2009  -- Pneumococcal Pneumonia (once then every 5 years): Has not received    One time confirmation of immunity or serologies:  -- Hepatitis A (serologies or immunizations): 2009  -- Hepatitis B (serologies or immunizations): 1997, serologies show immunity  -- Zoster vaccination (>61 yo, discuss if over 50): has not received  -- MMR:2002  -- HPV (all aged 18-26): has not received  -- Meningococcal meningitis (all patients at risk for meningitis): 2009     Bone mineral density screening   -- Recommend all patients supplement with calcium and vitamin D  -- Given prior steroid use recommend DEXA if not already done    Cancer Screening:  Colon cancer screening:  Unclear colonic involvement, but if >1/3 of the colon, recommend screening every 2-3 years. Has not had a full colonoscopy since 2007.     Cervical cancer screening: N/A    Skin cancer screening: Since patient is not immunocompromised, this is per patient's dermatologist recommendations.    Depression Screening:  -- Over the last month, have you felt down, depressed, or hopeless? Yes  -- Over the last month, have you felt little interest or pleasure doing things? Yes    Misc:  -- Avoid tobacco use  -- Avoid NSAIDs as there is potentially a 25% chance of causing an IBD flare    RTC 3 months    Thank you for this consultation.  It was a pleasure to participate in the care of this patient; please contact us with any further questions.      Ari De PA-C  Division of Gastroenterology, Hepatology and Nutrition  [facility]      HPI:   Mr. Anglin is a 23 year old year old male here for Crohn's disease follow up.  Patient has done intermittent maintenance medications when he feels a flare coming on.  Approximately 6 weeks ago he began noticing mucus in his stool with excess gas.  Approximately 2 weeks ago he began seeing  blood in his stool and about half of his bowel movements, and occasionally blood can drip into the toilet.  Consistency is variable, sometimes just mucus.  When he does pass stool, often it is just pallets.  He has 6-8 trips to the toilet, 3 actual passage of fecal material, others are tenesmus episodes.  He also notes when he has been at school and it is difficult to take time for the restroom, he will not have a bowel movement all day and will have a bowel movement when he comes home.    Because of ongoing inflammation primarily in the rectum from flexible sigmoidoscopy, Cortenemas were prescribed, and he believes this was effective in that he did not have any episodes of blood in the stool until most recently.  Due to increase in symptoms, he refilled his prescription for Rowasa and cortenemas and began doing Rowasa in the morning and the cortenema at night.  He has had difficulty keeping both medications in for sufficient amount of time.  Despite starting these medications, he has not seen much relief.  He does not remember the last time he took Pentasa.    He continues to have anxiety surrounding mind altering the medications, with procedures requiring anesthesia.  He denies any traumatic events surrounding colonoscopies, but has refused full colonoscopies and upper endoscopies due to sedation requirements. He has been willing to do flexible sigmoidoscopies without sedation.    ROS:  Complete 10 System ROS performed. All are negative except as documented below, in the HPI, or in patient questionnaire from today's visit.    No fevers or chills  No weight loss  No blurry vision, double vision or change in vision  No sore throat  No lymphadenopathy  No headache, paraesthesias, or weakness in a limb  No shortness of breath or wheezing  No chest pain or pressure  No arthralgias or myalgias  No rashes or skin changes  No odynophagia or dysphagia  + BRBPR, hematochezia, melena  No dysuria, frequency or urgency  No  hot/cold intolerance or polyria  + anxiety or depression    Extra intestinal manifestations of IBD:  No uveitis/episcleritis  No aphthous ulcers   No arthritis   No erythema nodosum/pyoderma gangrenosum.     PERTINENT PAST MEDICAL HISTORY:  Past Medical History:   Diagnosis Date     Crohn's disease (H)        PREVIOUS SURGERIES:  Adenoidectomy  No abdominal surgeries    ALLERGIES:   No Known Allergies    PERTINENT MEDICATIONS:    Current Outpatient Prescriptions:      hydrocortisone (CORTENEMA) 100 MG/60ML enema, Place 1 enema rectally At Bedtime for 21 days No more refills until appt ., Disp: 21 enema, Rfl: 0     mesalamine (ROWASA) 4 g enema, Place 1 enema (4 g) rectally At Bedtime No more refills until clinic visit., Disp: 30 enema, Rfl: 0     calcium carbonate (OS-HARLAN 500 MG [place]. [place]) 500 MG tablet, Take 2,000 mg by mouth 2 times daily, Disp: , Rfl:      Cholecalciferol (VITAMIN D) 2000 UNITS CAPS, Take 50,000 Units by mouth , Disp: , Rfl:      mesalamine (PENTASA) 500 MG CPCR CR capsule, Take 4 capsules (2,000 mg) by mouth 2 times daily (Patient not taking: Reported on 9/5/2018), Disp: 240 capsule, Rfl: 3     Nutritional Supplements (ENSURE COMPLETE SHAKE PO), Take  by mouth., Disp: , Rfl:     SOCIAL HISTORY:  Social History     Social History     Marital status: Single     Spouse name: N/A     Number of children: N/A     Years of education: N/A     Occupational History     Not on file.     Social History Main Topics     Smoking status: Never Smoker     Smokeless tobacco: Not on file     Alcohol use Not on file     Drug use: Not on file     Sexual activity: Not on file     Other Topics Concern     Not on file     Social History Narrative       FAMILY HISTORY:  -Father had a history of IBS and Parkinson's disease.    -Maternal grandmother was 80 when she had colon polyps.    -His maternal uncle also had colon cancer at the age of 54.     PHYSICAL EXAMINATION:  Constitutional: aaox3, cooperative, pleasant, not  "dyspneic/diaphoretic, no acute distress  Vitals reviewed: /78  Pulse 85  Temp 98.2  F (36.8  C)  Ht 1.753 m (5' 9\")  Wt 76.9 kg (169 lb 8 oz)  SpO2 99%  BMI 25.03 kg/m2  Wt:   Wt Readings from Last 2 Encounters:   09/05/18 76.9 kg (169 lb 8 oz)   07/10/17 76 kg (167 lb 9.6 oz)      Eyes: Sclera anicteric/injected  Ears/nose/mouth/throat: Normal oropharynx without ulcers or exudate, mucus membranes moist, hearing intact  Neck: supple, thyroid normal size  CV: No edema  Respiratory: Unlabored breathing  Lymph: No axillary, submandibular, supraclavicular or inguinal lymphadenopathy  Abd: Nondistended, +bs, no hepatosplenomegaly, nontender, no peritoneal signs  Skin: warm, perfused, no jaundice  Psych: Normal affect  MSK: Normal gait      PERTINENT STUDIES:  Most recent CBC:  Recent Labs   Lab Test  07/12/17   1316  09/16/16   1408   WBC  6.2  17.7*   HGB  17.5  19.2*   HCT  50.7  55.5*   PLT  315  372     Most recent hepatic panel:  Recent Labs   Lab Test  07/12/17   1316  09/16/16   1408   ALT  29  56*   AST  15  16     Most recent creatinine:  Recent Labs   Lab Test  07/12/17   1316  07/08/13   1422   CR  1.10  1.03*       Answers for HPI/ROS submitted by the patient on 9/3/2018   General Symptoms: Yes  Skin Symptoms: No  HENT Symptoms: No  EYE SYMPTOMS: No  HEART SYMPTOMS: No  LUNG SYMPTOMS: No  INTESTINAL SYMPTOMS: Yes  URINARY SYMPTOMS: No  REPRODUCTIVE SYMPTOMS: Yes  SKELETAL SYMPTOMS: No  BLOOD SYMPTOMS: No  NERVOUS SYSTEM SYMPTOMS: No  MENTAL HEALTH SYMPTOMS: No  Fever: No  Loss of appetite: No  Weight loss: No  Weight gain: No  Fatigue: Yes  Night sweats: No  Chills: No  Increased stress: No  Excessive hunger: No  Excessive thirst: No  Feeling hot or cold when others believe the temperature is normal: No  Loss of height: No  Post-operative complications: No  Surgical site pain: No  Hallucinations: No  Change in or Loss of Energy: Yes  Hyperactivity: No  Confusion: No  Heart burn or indigestion: " No  Nausea: No  Vomiting: No  Abdominal pain: Yes  Bloating: No  Constipation: No  Diarrhea: Yes  Blood in stool: Yes  Black stools: No  Rectal or Anal pain: No  Fecal incontinence: No  Yellowing of skin or eyes: No  Vomit with blood: No  Change in stools: No  Scrotal pain or swelling: No  Erectile dysfunction: No  Penile discharge: No  Genital ulcers: No  Reduced libido: Yes

## 2018-09-05 NOTE — PATIENT INSTRUCTIONS
It was a pleasure taking care of you today.  I've included a brief summary of our discussion and care plan from today's visit below.  Please review this information with your primary care provider.  ______________________________________________________________________    My recommendations are summarized as follows:    -- Restart Pentasa 500 mg four times daily (total of 2000 mg daily)  -- Continue rowasa and cortenemas  -- Plan to schedule flexible sigmoidoscopy   -- Labs today  -- Stool sample   -- Next endoscopic assessment: flexible sigmoidoscopy  -- Patient with IBD we recommend supplementation vitamin D 1000 units daily and calcium 500 mg twice daily.  -- Vaccines/immunizations to be updated: flu shot every year, recommend pneumonia vaccine  -- No NSAIDs (ibuprofen, or anything containing ibuprofen)       For additional resources about inflammatory bowel disease visit [email or web address]      Return to GI Clinic in 3 months to review your progress.    ______________________________________________________________________    Who do I call with any questions after my visit?  Please be in touch if there are any further questions that arise following today's visit.  There are multiple ways to contact your gastroenterology care team.        During business hours, you may reach a Gastroenterology nurse at [phone], option [phone].       To schedule or reschedule an appointment, please call [phone].       You can always send a secure message through [employer].  [employer] messages are answered by your nurse or doctor typically within 24 hours.  Please allow extra time on weekends and holidays.        For urgent/emergent questions after business hours, you may reach the on-call GI Fellow by contacting the [facility] at [phone].     How will I get the results of any tests ordered?    You will receive all of your results.  If you have signed up for [employer], any tests ordered at your visit will be available  to you after your physician reviews them.  Typically this takes 1-2 weeks.  If there are urgent results that require a change in your care plan, your physician or nurse will call you to discuss the next steps.      What is [employer]?  [employer] is a secure way for you to access all of your healthcare records from the [facility].  It is a web based computer program, so you can sign on to it from any location.  It also allows you to send secure messages to your care team.  I recommend signing up for [employer] access if you have not already done so and are comfortable with using a computer.      How to I schedule a follow-up visit?  If you did not schedule a follow-up visit today, please call [phone] to schedule a follow-up office visit.        Sincerely,    Ari De PA-C  [facility]  Division of Gastroenterology

## 2018-09-05 NOTE — LETTER
9/5/2018     RE: Ferdinand Anglin  [address] [place] [address]     Dear Colleague,    Thank you for referring your patient, Ferdinand Anglin, to the [facility] at [facility]. Please see a copy of my visit note below.    IBD CLINIC VISIT     CC/REFERRING MD:  Sandi Holloway MD  REASON FOR FOLLOW UP: Crohn's disease  COLLABORATING PHYSICIAN: Estrada Whipple MD    CROHN'S HISTORY:  Age at diagnosis: age 8  Extent of disease: had gastritis and some duodenitis with granulomas in duodenum, also has had ileitis and colitis. Exact extent of involvement in colon on diagnosis not available, although colonoscopy 2007 showed some inflammation in rectosigmoid. Follow up EGD showed no granulomas   Disease phenotype: inflammatory  Isi-anal disease: None  Current CD medications: Rowasa enemas nightly, cortenemas nightly (for the last 2 weeks)  Prior IBD surgeries: None  Prior IBD Medications: He has been on Pentasa in the past, anywhere from 500 b.i.d. to 2000 mg p.o. b.i.d.  He has been on-and-off prednisone over the years.  He recently tried Entocort with no improvement in his symptoms.  He had been on Humira in the past with some question of improvement; however, he and his family all deny that the Humira helped him at all. That was back in 2013.     DRUG MONITORING  TPMT enzyme activity: 22.7, normal    6-TGN/6-MMPN levels: --    Biologic concentration:--    DISEASE ASSESSMENT  Labs:  Lab Results   Component Value Date    ALBUMIN 4.2 07/12/2017     Recent Labs   Lab Test  07/12/17   1316  09/16/16   1408   CRP  <2.9  <2.9   SED  7  8     Endoscopic assessment: flex sig 7/19/17 showed overall appearance of UC, Ibrahim 1-2 (at most) in rectum (distal 5-10cm oc colon) and mild erythema in sigmoid. Bx of polyp at splenic flexure shows TA with paneth cell metaplasia, other bx of descending, sigmoid and rectum show chronic changes, no active  inflammation.  Enterography: 10/2016 MRE normal  Fecal calprotectin: --   C diff: --  ASSESSMENT/PLAN  Mr. Anglin is a 23 year old year old male here for Crohn's disease follow up, with symptoms concerning for a flare.    1. Crohn's disease (presumed ileocolonic): Symptoms over the last 6 weeks concerning for active inflammation.  Patient is still very hesitant to move forward with any procedure requiring sedation.  He is open to a repeat flexible sigmoidoscopy to assess for active disease.  We again reviewed importance of a full colonoscopy for restaging in addition to his tubular adenoma that was found at the splenic flexure last flexible sigmoidoscopy in 2017.  Patient understands these risks of foregoing full surveillance colonoscopy, and would like to proceed with a flexible sigmoidoscopy without sedation.  There had been a plan to also proceed with an upper endoscopy with restaging, but again patient would like to hold off on this for now.  It is interesting that patient has been able to alter his trips to the bathroom while at school and that the number of bowel movements is dependent on his access to a bathroom.  We will need to objectively assess for any active inflammation, knowing that he has already started steroid enemas since symptoms have started, which could possibly give us a false sense of security.  It seems that enemas have been difficult for patient to tolerate with keeping the medication in, could consider budesonide foam in the future, although this does not allow for as much coverage, may provide some additional relief.  --Labs today to include CBC, LFTs, CRP, ESR  --Stool studies to be completed to assess for infection   -- Flexible sigmoidoscopy in the near future  -- Depending on findings of flexible sigmoidoscopy, need to determine a regimen that patient is willing to fully commit to and allows for maintenance and remission.    2. Depressed mood: Patient endorses both little interest doing  things and feeling depressed over the last month.  This certainly could be associated with increased symptoms of his IBD.  I strongly encourage patient to follow up with our health psychologist and to establish care for further assessment in the setting of chronic disease.    3. IBD healthcare maintenance based on patients current medication:  5-ASA   Vaccinations:  -- Influenza (every year): Recommend yearly  -- TdaP (every 10 years): Last given 2009  -- Pneumococcal Pneumonia (once then every 5 years): Has not received    One time confirmation of immunity or serologies:  -- Hepatitis A (serologies or immunizations): 2009  -- Hepatitis B (serologies or immunizations): 1997, serologies show immunity  -- Zoster vaccination (>59 yo, discuss if over 50): has not received  -- MMR:2002  -- HPV (all aged 18-26): has not received  -- Meningococcal meningitis (all patients at risk for meningitis): 2009     Bone mineral density screening   -- Recommend all patients supplement with calcium and vitamin D  -- Given prior steroid use recommend DEXA if not already done    Cancer Screening:  Colon cancer screening:  Unclear colonic involvement, but if >1/3 of the colon, recommend screening every 2-3 years. Has not had a full colonoscopy since 2007.     Cervical cancer screening: N/A    Skin cancer screening: Since patient is not immunocompromised, this is per patient's dermatologist recommendations.    Depression Screening:  -- Over the last month, have you felt down, depressed, or hopeless? Yes  -- Over the last month, have you felt little interest or pleasure doing things? Yes    Misc:  -- Avoid tobacco use  -- Avoid NSAIDs as there is potentially a 25% chance of causing an IBD flare    RTC 3 months    Thank you for this consultation.  It was a pleasure to participate in the care of this patient; please contact us with any further questions.      Ari De PA-C  Division of Gastroenterology, Hepatology and  Nutrition  [facility]      HPI:   Mr. Anglin is a 23 year old year old male here for Crohn's disease follow up.  Patient has done intermittent maintenance medications when he feels a flare coming on.  Approximately 6 weeks ago he began noticing mucus in his stool with excess gas.  Approximately 2 weeks ago he began seeing blood in his stool and about half of his bowel movements, and occasionally blood can drip into the toilet.  Consistency is variable, sometimes just mucus.  When he does pass stool, often it is just pallets.  He has 6-8 trips to the toilet, 3 actual passage of fecal material, others are tenesmus episodes.  He also notes when he has been at school and it is difficult to take time for the restroom, he will not have a bowel movement all day and will have a bowel movement when he comes home.    Because of ongoing inflammation primarily in the rectum from flexible sigmoidoscopy, Cortenemas were prescribed, and he believes this was effective in that he did not have any episodes of blood in the stool until most recently.  Due to increase in symptoms, he refilled his prescription for Rowasa and cortenemas and began doing Rowasa in the morning and the cortenema at night.  He has had difficulty keeping both medications in for sufficient amount of time.  Despite starting these medications, he has not seen much relief.  He does not remember the last time he took Pentasa.    He continues to have anxiety surrounding mind altering the medications, with procedures requiring anesthesia.  He denies any traumatic events surrounding colonoscopies, but has refused full colonoscopies and upper endoscopies due to sedation requirements. He has been willing to do flexible sigmoidoscopies without sedation.    ROS:  Complete 10 System ROS performed. All are negative except as documented below, in the HPI, or in patient questionnaire from today's visit.    No fevers or chills  No weight loss  No blurry vision,  double vision or change in vision  No sore throat  No lymphadenopathy  No headache, paraesthesias, or weakness in a limb  No shortness of breath or wheezing  No chest pain or pressure  No arthralgias or myalgias  No rashes or skin changes  No odynophagia or dysphagia  + BRBPR, hematochezia, melena  No dysuria, frequency or urgency  No hot/cold intolerance or polyria  + anxiety or depression    Extra intestinal manifestations of IBD:  No uveitis/episcleritis  No aphthous ulcers   No arthritis   No erythema nodosum/pyoderma gangrenosum.     PERTINENT PAST MEDICAL HISTORY:  Past Medical History:   Diagnosis Date     Crohn's disease (H)        PREVIOUS SURGERIES:  Adenoidectomy  No abdominal surgeries    ALLERGIES:   No Known Allergies    PERTINENT MEDICATIONS:    Current Outpatient Prescriptions:      hydrocortisone (CORTENEMA) 100 MG/60ML enema, Place 1 enema rectally At Bedtime for 21 days No more refills until appt ., Disp: 21 enema, Rfl: 0     mesalamine (ROWASA) 4 g enema, Place 1 enema (4 g) rectally At Bedtime No more refills until clinic visit., Disp: 30 enema, Rfl: 0     calcium carbonate (OS-HARLAN 500 MG [place]) 500 MG tablet, Take 2,000 mg by mouth 2 times daily, Disp: , Rfl:      Cholecalciferol (VITAMIN D) 2000 UNITS CAPS, Take 50,000 Units by mouth , Disp: , Rfl:      mesalamine (PENTASA) 500 MG CPCR CR capsule, Take 4 capsules (2,000 mg) by mouth 2 times daily (Patient not taking: Reported on 9/5/2018), Disp: 240 capsule, Rfl: 3     Nutritional Supplements (ENSURE COMPLETE SHAKE PO), Take  by mouth., Disp: , Rfl:     SOCIAL HISTORY:  Social History     Social History     Marital status: Single     Spouse name: N/A     Number of children: N/A     Years of education: N/A     Occupational History     Not on file.     Social History Main Topics     Smoking status: Never Smoker     Smokeless tobacco: Not on file     Alcohol use Not on file     Drug use: Not on file     Sexual activity: Not on file     Other  "Topics Concern     Not on file     Social History Narrative       FAMILY HISTORY:  -Father had a history of IBS and Parkinson's disease.    -Maternal grandmother was 80 when she had colon polyps.    -His maternal uncle also had colon cancer at the age of 54.     PHYSICAL EXAMINATION:  Constitutional: aaox3, cooperative, pleasant, not dyspneic/diaphoretic, no acute distress  Vitals reviewed: /78  Pulse 85  Temp 98.2  F (36.8  C)  Ht 1.753 m (5' 9\")  Wt 76.9 kg (169 lb 8 oz)  SpO2 99%  BMI 25.03 kg/m2  Wt:   Wt Readings from Last 2 Encounters:   09/05/18 76.9 kg (169 lb 8 oz)   07/10/17 76 kg (167 lb 9.6 oz)      Eyes: Sclera anicteric/injected  Ears/nose/mouth/throat: Normal oropharynx without ulcers or exudate, mucus membranes moist, hearing intact  Neck: supple, thyroid normal size  CV: No edema  Respiratory: Unlabored breathing  Lymph: No axillary, submandibular, supraclavicular or inguinal lymphadenopathy  Abd: Nondistended, +bs, no hepatosplenomegaly, nontender, no peritoneal signs  Skin: warm, perfused, no jaundice  Psych: Normal affect  MSK: Normal gait      PERTINENT STUDIES:  Most recent CBC:  Recent Labs   Lab Test  07/12/17   1316  09/16/16   1408   WBC  6.2  17.7*   HGB  17.5  19.2*   HCT  50.7  55.5*   PLT  315  372     Most recent hepatic panel:  Recent Labs   Lab Test  07/12/17   1316  09/16/16   1408   ALT  29  56*   AST  15  16     Most recent creatinine:  Recent Labs   Lab Test  07/12/17   1316  07/08/13   1422   CR  1.10  1.03*       Answers for HPI/ROS submitted by the patient on 9/3/2018   General Symptoms: Yes  Skin Symptoms: No  HENT Symptoms: No  EYE SYMPTOMS: No  HEART SYMPTOMS: No  LUNG SYMPTOMS: No  INTESTINAL SYMPTOMS: Yes  URINARY SYMPTOMS: No  REPRODUCTIVE SYMPTOMS: Yes  SKELETAL SYMPTOMS: No  BLOOD SYMPTOMS: No  NERVOUS SYSTEM SYMPTOMS: No  MENTAL HEALTH SYMPTOMS: No  Fever: No  Loss of appetite: No  Weight loss: No  Weight gain: No  Fatigue: Yes  Night sweats: No  Chills: " No  Increased stress: No  Excessive hunger: No  Excessive thirst: No  Feeling hot or cold when others believe the temperature is normal: No  Loss of height: No  Post-operative complications: No  Surgical site pain: No  Hallucinations: No  Change in or Loss of Energy: Yes  Hyperactivity: No  Confusion: No  Heart burn or indigestion: No  Nausea: No  Vomiting: No  Abdominal pain: Yes  Bloating: No  Constipation: No  Diarrhea: Yes  Blood in stool: Yes  Black stools: No  Rectal or Anal pain: No  Fecal incontinence: No  Yellowing of skin or eyes: No  Vomit with blood: No  Change in stools: No  Scrotal pain or swelling: No  Erectile dysfunction: No  Penile discharge: No  Genital ulcers: No  Reduced libido: Yes    Again, thank you for allowing me to participate in the care of your patient.      Sincerely,    Ari De PA-C

## 2018-09-05 NOTE — NURSING NOTE
"Chief Complaint   Patient presents with     Medication Request     Crohns medication renewal.       Vitals:    09/05/18 1129   BP: 134/78   Pulse: 85   Temp: 98.2  F (36.8  C)   SpO2: 99%   Weight: 169 lb 8 oz   Height: 5' 9\"       Body mass index is 25.03 kg/(m^2).      Garry Alejandre, EMT                      "

## 2018-09-05 NOTE — MR AVS SNAPSHOT
After Visit Summary   9/5/2018    Ferdinand Anglin    MRN: [id number]           Patient Information     Date Of Birth          1995        Visit Information        Provider Department      9/5/2018 11:40 AM Ari De PA-C [facility]        Today's Diagnoses     Crohn's disease of both small and large intestine with rectal bleeding (H)    -  1    Crohn's disease of both small and large intestine with rectal bleeding (H) [K50.811]          Care Instructions    It was a pleasure taking care of you today.  I've included a brief summary of our discussion and care plan from today's visit below.  Please review this information with your primary care provider.  ______________________________________________________________________    My recommendations are summarized as follows:    -- Restart Pentasa 500 mg four times daily (total of 2000 mg daily)  -- Continue rowasa and cortenemas  -- Plan to schedule flexible sigmoidoscopy   -- Labs today  -- Stool sample   -- Next endoscopic assessment: flexible sigmoidoscopy  -- Patient with IBD we recommend supplementation vitamin D 1000 units daily and calcium 500 mg twice daily.  -- Vaccines/immunizations to be updated: flu shot every year, recommend pneumonia vaccine  -- No NSAIDs (ibuprofen, or anything containing ibuprofen)       For additional resources about inflammatory bowel disease visit [email or web address]      Return to GI Clinic in 3 months to review your progress.    ______________________________________________________________________    Who do I call with any questions after my visit?  Please be in touch if there are any further questions that arise following today's visit.  There are multiple ways to contact your gastroenterology care team.        During business hours, you may reach a Gastroenterology nurse at [phone], option 3.       To schedule or reschedule an appointment, please call [phone].       You can  always send a secure message through [employer].  [employer] messages are answered by your nurse or doctor typically within 24 hours.  Please allow extra time on weekends and holidays.        For urgent/emergent questions after business hours, you may reach the on-call GI Fellow by contacting the [facility]  at [phone].     How will I get the results of any tests ordered?    You will receive all of your results.  If you have signed up for [employer], any tests ordered at your visit will be available to you after your physician reviews them.  Typically this takes 1-2 weeks.  If there are urgent results that require a change in your care plan, your physician or nurse will call you to discuss the next steps.      What is [employer]?  [employer] is a secure way for you to access all of your healthcare records from the [facility].  It is a web based computer program, so you can sign on to it from any location.  It also allows you to send secure messages to your care team.  I recommend signing up for [employer] access if you have not already done so and are comfortable with using a computer.      How to I schedule a follow-up visit?  If you did not schedule a follow-up visit today, please call [phone] to schedule a follow-up office visit.        Sincerely,    Ari De PA-C  [facility]  Division of Gastroenterology                Follow-ups after your visit        Additional Services     GASTROENTEROLOGY ADULT REF PROCEDURE ONLY       Last Lab Result: Creatinine (mg/dL)       Date                     Value                 07/12/2017               1.10             ----------  Body mass index is 25.03 kg/(m^2).     Needed:  No  Language:  English    Patient will be contacted to schedule procedure.     Please be aware that coverage of these services is subject to the terms and limitations of your health insurance plan.  Call member services at your health plan with any  benefit or coverage questions.  Any procedures must be performed at a [facility] OR coordinated by your clinic's referral office.    Please bring the following with you to your appointment:    (1) Any X-Rays, CTs or MRIs which have been performed.  Contact the facility where they were done to arrange for  prior to your scheduled appointment.    (2) List of current medications   (3) This referral request   (4) Any documents/labs given to you for this referral                  Follow-up notes from your care team     Return in about 3 months (around 12/5/2018).      Future tests that were ordered for you today     Open Future Orders        Priority Expected Expires Ordered    Basic metabolic panel [LAB15] Routine 9/5/2018 11/4/2018 9/5/2018    Hepatic panel [LAB20] Routine 9/5/2018 11/4/2018 9/5/2018    Clostridium difficile toxin B PCR [YSW8643] Routine 9/5/2018 11/4/2018 9/5/2018    Ova and Parasite Exam Routine [KRQ6400] Routine 9/5/2018 11/4/2018 9/5/2018    Giardia antigen [EAR739] Routine 9/5/2018 11/4/2018 9/5/2018    Enteric Bacteria and Virus Panel by JOHNNY Stool [RGG6568] Routine 9/5/2018 11/4/2018 9/5/2018    CBC with platelets differential [TFJ295] Routine 9/5/2018 11/4/2018 9/5/2018    CRP inflammation [PPL2417] Routine 9/5/2018 11/4/2018 9/5/2018    Erythrocyte sedimentation rate auto [YRZ239] Routine 9/5/2018 11/4/2018 9/5/2018            Who to contact     Please call your clinic at [phone] to:    Ask questions about your health    Make or cancel appointments    Discuss your medicines    Learn about your test results    Speak to your doctor            Additional Information About Your Visit        Before the [employer] Information     [employer] gives you secure access to your electronic health record. If you see a primary care provider, you can also send messages to your care team and make appointments. If you have questions, please call your primary care clinic.  If you do not have a primary care  "provider, please call [phone] and they will assist you.      [employer] is an electronic gateway that provides easy, online access to your medical records. With [employer], you can request a clinic appointment, read your test results, renew a prescription or communicate with your care team.     To access your existing account, please contact your [facility] or call [phone] for assistance.        Care EveryWhere ID     This is your Care EveryWhere ID. This could be used by other organizations to access your [facility] medical records  [id number]        Your Vitals Were     Pulse Temperature Height Pulse Oximetry BMI (Body Mass Index)       85 98.2  F (36.8  C) 1.753 m (5' 9\") 99% 25.03 kg/m2        Blood Pressure from Last 3 Encounters:   09/05/18 134/78   07/10/17 128/71   09/13/16 143/87    Weight from Last 3 Encounters:   09/05/18 76.9 kg (169 lb 8 oz)   07/10/17 76 kg (167 lb 9.6 oz)   10/14/16 74.8 kg (165 lb) (65 %)*     * Growth percentiles are based on [facility] 2-20 Years data.              We Performed the Following     GASTROENTEROLOGY ADULT REF PROCEDURE ONLY          Today's Medication Changes          These changes are accurate as of 9/5/18 12:25 PM.  If you have any questions, ask your nurse or doctor.               These medicines have changed or have updated prescriptions.        Dose/Directions    * mesalamine 4 g enema   Commonly known as:  ROWASA   This may have changed:  Another medication with the same name was changed. Make sure you understand how and when to take each.   Used for:  Crohn's disease of both small and large intestine with rectal bleeding (H)        Dose:  4 g   Place 1 enema (4 g) rectally At Bedtime No more refills until clinic visit.   Quantity:  30 enema   Refills:  0       * mesalamine 500 MG Cpcr CR capsule   Commonly known as:  PENTASA   This may have changed:    - how much to take  - when to take this   Used for:  Crohn's disease of both small " and large intestine with rectal bleeding (H)        Dose:  500 mg   Take 1 capsule (500 mg) by mouth 4 times daily   Quantity:  240 capsule   Refills:  3       * Notice:  This list has 2 medication(s) that are the same as other medications prescribed for you. Read the directions carefully, and ask your doctor or other care provider to review them with you.         Where to get your medicines      These medications were sent to [id number] - [place], [place] - [address] AT [facility] [address] [place] [address]  [address] [place] [address]    Hours:  24-hours Phone:  [phone]     mesalamine 500 MG Cpcr CR capsule                Primary Care Provider Office Phone # Fax #    Sandi Holloway [phone]       [facility]  [address]  [place] [address]        Equal Access to Services     [facility] AH: Hadii aad ku hadasho Soomaali, waaxda luqadaha, qaybta kaalmada adeegyada, waxay idiin hayaan lola ayala . So [facility] [phone].    ATENCIÓN: Si habla español, tiene a roberto disposición servicios gratuitos de asistencia lingüística. Imelda al [phone].    We comply with applicable federal civil rights laws and [place] laws. We do not discriminate on the basis of race, color, national origin, age, disability, sex, sexual orientation, or gender identity.            Thank you!     Thank you for choosing [facility]  for your care. Our goal is always to provide you with excellent care. Hearing back from our patients is one way we can continue to improve our services. Please take a few minutes to complete the written survey that you may receive in the mail after your visit with us. Thank you!             Your Updated Medication List - Protect others around you: Learn how to safely use, store and throw away your medicines at www.disposemymeds.org.          This list is accurate as of 9/5/18 12:25 PM.  Always  use your most recent med list.                   Brand Name Dispense Instructions for use Diagnosis    calcium carbonate 500 mg {elemental} 500 MG tablet    OS-HARLAN     Take 2,000 mg by mouth 2 times daily        ENSURE COMPLETE SHAKE PO      Take  by mouth.        hydrocortisone 100 MG/60ML enema    CORTENEMA    21 enema    Place 1 enema rectally At Bedtime for 21 days No more refills until appt .    Indeterminate colitis       * mesalamine 4 g enema    ROWASA    30 enema    Place 1 enema (4 g) rectally At Bedtime No more refills until clinic visit.    Crohn's disease of both small and large intestine with rectal bleeding (H)       * mesalamine 500 MG Cpcr CR capsule    PENTASA    240 capsule    Take 1 capsule (500 mg) by mouth 4 times daily    Crohn's disease of both small and large intestine with rectal bleeding (H)       vitamin D 2000 units Caps      Take 50,000 Units by mouth        * Notice:  This list has 2 medication(s) that are the same as other medications prescribed for you. Read the directions carefully, and ask your doctor or other care provider to review them with you.

## 2018-09-06 ENCOUNTER — TELEPHONE (OUTPATIENT)
Dept: GASTROENTEROLOGY | Facility: CLINIC | Age: 23
End: 2018-09-06

## 2018-09-06 LAB
ALBUMIN SERPL-MCNC: 3.8 G/DL (ref 3.4–5)
ALP SERPL-CCNC: 91 U/L (ref 40–150)
ALT SERPL W P-5'-P-CCNC: 22 U/L (ref 0–70)
ANION GAP SERPL CALCULATED.3IONS-SCNC: 8 MMOL/L (ref 3–14)
AST SERPL W P-5'-P-CCNC: 14 U/L (ref 0–45)
BILIRUB DIRECT SERPL-MCNC: <0.1 MG/DL (ref 0–0.2)
BILIRUB SERPL-MCNC: 0.4 MG/DL (ref 0.2–1.3)
BUN SERPL-MCNC: 14 MG/DL (ref 7–30)
CALCIUM SERPL-MCNC: 9.3 MG/DL (ref 8.5–10.1)
CHLORIDE SERPL-SCNC: 103 MMOL/L (ref 94–109)
CO2 SERPL-SCNC: 28 MMOL/L (ref 20–32)
CREAT SERPL-MCNC: 1.17 MG/DL (ref 0.66–1.25)
GFR SERPL CREATININE-BSD FRML MDRD: 78 ML/MIN/1.7M2
GLUCOSE SERPL-MCNC: 111 MG/DL (ref 70–99)
POTASSIUM SERPL-SCNC: 3.8 MMOL/L (ref 3.4–5.3)
PROT SERPL-MCNC: 8 G/DL (ref 6.8–8.8)
SODIUM SERPL-SCNC: 139 MMOL/L (ref 133–144)

## 2018-09-07 ENCOUNTER — TELEPHONE (OUTPATIENT)
Dept: GASTROENTEROLOGY | Facility: CLINIC | Age: 23
End: 2018-09-07

## 2018-09-07 DIAGNOSIS — K50.811 CROHN'S DISEASE OF BOTH SMALL AND LARGE INTESTINE WITH RECTAL BLEEDING (H): ICD-10-CM

## 2018-09-07 LAB
C DIFF TOX B STL QL: NEGATIVE
SPECIMEN SOURCE: NORMAL

## 2018-09-07 PROCEDURE — 87209 SMEAR COMPLEX STAIN: CPT | Performed by: PHYSICIAN ASSISTANT

## 2018-09-07 PROCEDURE — 87177 OVA AND PARASITES SMEARS: CPT | Performed by: PHYSICIAN ASSISTANT

## 2018-09-07 PROCEDURE — 87493 C DIFF AMPLIFIED PROBE: CPT | Mod: 59 | Performed by: PHYSICIAN ASSISTANT

## 2018-09-07 PROCEDURE — 87506 IADNA-DNA/RNA PROBE TQ 6-11: CPT | Performed by: PHYSICIAN ASSISTANT

## 2018-09-07 PROCEDURE — 87329 GIARDIA AG IA: CPT | Mod: 59 | Performed by: PHYSICIAN ASSISTANT

## 2018-09-10 DIAGNOSIS — K50.811 CROHN'S DISEASE OF BOTH SMALL AND LARGE INTESTINE WITH RECTAL BLEEDING (H): ICD-10-CM

## 2018-09-10 DIAGNOSIS — K52.3 INDETERMINATE COLITIS: ICD-10-CM

## 2018-09-10 LAB
G LAMBLIA AG STL QL IA: NORMAL
O+P STL MICRO: NORMAL
O+P STL MICRO: NORMAL
SPECIMEN SOURCE: NORMAL
SPECIMEN SOURCE: NORMAL

## 2018-09-11 ENCOUNTER — CARE COORDINATION (OUTPATIENT)
Dept: GASTROENTEROLOGY | Facility: CLINIC | Age: 23
End: 2018-09-11

## 2018-09-11 DIAGNOSIS — K50.811 CROHN'S DISEASE OF BOTH SMALL AND LARGE INTESTINE WITH RECTAL BLEEDING (H): ICD-10-CM

## 2018-09-11 RX ORDER — MESALAMINE 4 G/60ML
4 SUSPENSION RECTAL AT BEDTIME
Qty: 30 ENEMA | Refills: 3 | Status: SHIPPED | OUTPATIENT
Start: 2018-09-11 | End: 2019-02-04

## 2018-09-11 RX ORDER — MESALAMINE 4 G/60ML
4 SUSPENSION RECTAL AT BEDTIME
Qty: 30 ENEMA | Refills: 11 | Status: CANCELLED | OUTPATIENT
Start: 2018-09-11

## 2018-09-11 RX ORDER — HYDROCORTISONE 100 MG/60ML
100 SUSPENSION RECTAL AT BEDTIME
Qty: 30 ENEMA | Refills: 11 | Status: CANCELLED | OUTPATIENT
Start: 2018-09-11

## 2018-09-11 RX ORDER — HYDROCORTISONE 100 MG/60ML
100 SUSPENSION RECTAL DAILY
Qty: 30 ENEMA | Refills: 3 | Status: SHIPPED | OUTPATIENT
Start: 2018-09-11 | End: 2019-02-04

## 2018-09-11 NOTE — TELEPHONE ENCOUNTER
"mesalamine (ROWASA) 4 g enema      Last Written Prescription Date:  8/15/18  Last Fill Quantity: 30 enema ,   # refills: 0  Routing refill request to provider for review/approval because:  Drug not on the GI refill protocol.    hydrocortisone (CORTENEMA) 100 MG/60ML enema      Last Written Prescription Date:  8/16/18  Last Fill Quantity: 21 enema,   # refills: 0  Routing refill request to provider for review/approval because:  Drug not on the GI refill protocol.    Discontinued list \"no refills until seen\".  *Pt was seen in clinic 9/5/18    Last Office Visit : 9/5/18  Future Office visit:  none        "

## 2018-09-20 ENCOUNTER — TELEPHONE (OUTPATIENT)
Dept: SURGERY | Facility: CLINIC | Age: 23
End: 2018-09-20

## 2018-09-20 NOTE — TELEPHONE ENCOUNTER
"Attempted to call patient both on Monday of this week and Thursday of this week to discuss the symptoms he is having. Parents are reporting that the patient is having 10 stools a day and has blood in his stools. They report that the patient is unable to hold his stools. Explained to the parents that we would like to discuss patients symptoms with the patient and discuss the options with him. They become irritated with me and feel as though their son is 'too busy to speak to me\". They requested that I text the patient and I informed them that I did not have th capabilities to text with patient and I requested that they text him and have him call me. They stated that he would not call me back.     I informed the family that I would both send a my chart message to the patient and then speak to a provider in clinic tomorrow about the patient and return their call.   "

## 2018-09-25 ENCOUNTER — TELEPHONE (OUTPATIENT)
Dept: GASTROENTEROLOGY | Facility: CLINIC | Age: 23
End: 2018-09-25

## 2018-09-25 NOTE — TELEPHONE ENCOUNTER
"Patients parents called through the call center and requested to speak to me concerning their son and his symptoms he was having. Parents are stating that the patient is having a crohn's flare and he needs new medications., specifically prednisone as they feel this is the only thing that works for him.  Parent's were informed that I have tried to contact the patient a number of times by both my chart and by phone and he has not responded to my attempts of contacting him. Parents are understandably concerned wit the patients health, but they were informed that until the patient calls or writes back to describe his symptoms a medication change cannot occur.    The Father became very upset and informed this writer that I was \"Inhibiting his son's care\" and I was choosing to be \"unethical\" towards his son.  Both parents informed me that the son is very busy and cannot be contacting me.     I explained to both the mother and the father again that the patient needed to call me or write back via my chart and explain his symptoms. I informed father I would send another my Chart message to the patient.   "

## 2018-11-07 ENCOUNTER — TELEPHONE (OUTPATIENT)
Dept: GASTROENTEROLOGY | Facility: CLINIC | Age: 23
End: 2018-11-07

## 2018-11-07 NOTE — TELEPHONE ENCOUNTER
Health Call Center    Phone Message    May a detailed message be left on voicemail: yes    Reason for Call: Symptoms or Concerns     If patient has red-flag symptoms, warm transfer to triage line    Current symptom or concern: Crohn's Flare up    Symptoms have been present for:  few week(s)    Has patient previously been seen for this? Yes    By Margaret    Date:     Are there any new or worsening symptoms? Yes:       Action Taken: Message routed to:  [facility] Pt having a flare up and would like to see an MD in GI as soon as possible. He has seen Sarabjit but since he is leaving he would like another MD. Nothing is available until 1/7/18 - He also thinks he needs a medication adjustment so please try to squeeze him in soon and give him a call

## 2018-11-12 ENCOUNTER — CARE COORDINATION (OUTPATIENT)
Dept: GASTROENTEROLOGY | Facility: CLINIC | Age: 23
End: 2018-11-12

## 2018-11-12 ENCOUNTER — PATIENT OUTREACH (OUTPATIENT)
Dept: GASTROENTEROLOGY | Facility: CLINIC | Age: 23
End: 2018-11-12

## 2018-11-12 NOTE — PROGRESS NOTES
I was contacted by patient relations saying that Ferdinand's father contacted them complaining that our clinic would not see him unless he had a flexible sigmoidoscopy first.    I contacted Alexx Anglin (Ferdinand's father). We discussed his concerns.    I stated that it is important to perform at least a flexible sigmoidoscopy (preferably a full colonoscopy) to determine the pattern and extent of his inflammation. This has been discussed with Ferdinand and his family repeatedly and documented in clinic notes. 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    I apologized if it appeared to Ferdinand or Alexx that our clinic was refusing to see him unless he had the procedure. This was not our intent and it was not our recommendation. It was a miscommunication.  We want the flex sig so we can know how best and safely to treat Ferdinand's condition. His father understands this and appreciates this.      I called Ferdinand to discuss this but he did not answer his phone.    We have appts open tomorrow at noon with me or on Friday with me at  in the afternoon.    My office will work with him to get scheduled to be seen.    We can discuss diagnostic and treatment options further at our clinic visit.    All questions were answered to Mr. Anglin's stated satisfaction.    Estrada Whipple MD    [facility]  Division of Gastroenterology, Hepatology and Nutrition

## 2018-11-13 ENCOUNTER — OFFICE VISIT (OUTPATIENT)
Dept: GASTROENTEROLOGY | Facility: CLINIC | Age: 23
End: 2018-11-13
Payer: COMMERCIAL

## 2018-11-13 VITALS
WEIGHT: 160.2 LBS | BODY MASS INDEX: 23.66 KG/M2 | SYSTOLIC BLOOD PRESSURE: 133 MMHG | HEART RATE: 85 BPM | OXYGEN SATURATION: 96 % | DIASTOLIC BLOOD PRESSURE: 71 MMHG | TEMPERATURE: 98.6 F

## 2018-11-13 DIAGNOSIS — K50.111 CROHN'S DISEASE OF LARGE INTESTINE WITH RECTAL BLEEDING (H): Primary | ICD-10-CM

## 2018-11-13 DIAGNOSIS — K50.811 CROHN'S DISEASE OF BOTH SMALL AND LARGE INTESTINE WITH RECTAL BLEEDING (H): ICD-10-CM

## 2018-11-13 RX ORDER — PREDNISONE 5 MG/1
TABLET ORAL
Qty: 228 TABLET | Refills: 0 | Status: SHIPPED | OUTPATIENT
Start: 2018-11-13 | End: 2018-11-13

## 2018-11-13 RX ORDER — PREDNISONE 5 MG/1
TABLET ORAL
Qty: 228 TABLET | Refills: 0 | Status: SHIPPED | OUTPATIENT
Start: 2018-11-13 | End: 2019-02-04

## 2018-11-13 RX ORDER — MESALAMINE 4 G/60ML
4 SUSPENSION RECTAL AT BEDTIME
Qty: 30 BOTTLE | Refills: 0 | Status: SHIPPED | OUTPATIENT
Start: 2018-11-13

## 2018-11-13 ASSESSMENT — ENCOUNTER SYMPTOMS
BOWEL INCONTINENCE: 1
BLOATING: 0
BLOOD IN STOOL: 1
NAUSEA: 0
HEARTBURN: 0
VOMITING: 0
JAUNDICE: 0
RECTAL PAIN: 0
DIARRHEA: 1
CONSTIPATION: 0
ABDOMINAL PAIN: 1

## 2018-11-13 ASSESSMENT — PAIN SCALES - GENERAL: PAINLEVEL: NO PAIN (0)

## 2018-11-13 NOTE — NURSING NOTE
Chief Complaint   Patient presents with     RECHECK     return patient       Vitals:    11/13/18 1216   BP: 133/71   Pulse: 85   Temp: 98.6  F (37  C)   TempSrc: Oral   SpO2: 96%   Weight: 72.7 kg (160 lb 3.2 oz)       Body mass index is 23.66 kg/(m^2).  KRYSTLE Blake

## 2018-11-13 NOTE — LETTER
11/13/2018       RE: Ferdinand Anglin  [address] [place] [address]     Dear Colleague,    Thank you for referring your patient, Ferdinand Anglin, to the [facility] GASTROENTEROLOGY AND IBD CLINIC at [facility]. Please see a copy of my visit note below.    IBD CLINIC VISIT    CC/REFERRING MD:  Referred Self    REASON FOR CONSULTATION: follow up IBD    IBD HISTORY  Age at diagnosis: age 8  Extent of disease: On initial endoscopies he had gastritis and some duodenitis with granulomas in duodenum, also has had ileitis and colitis. Exact extent of involvement in colon on diagnosis not available, although colonoscopy 2007 showed some inflammation in rectosigmoid. Follow up EGD showed no granulomas. 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  Disease phenotype: inflammatory  Isi-anal disease: None  Current CD medications:   -Pentasa 2000 mg PO BID - started again in September  -Cortenemas - has used intermittently since September - more recently nightly for the past few days  Prior IBD surgeries: None  Prior IBD Medications: He has been on Pentasa in the past, anywhere from 500 b.i.d. to 2000 mg p.o. b.i.d.  He has been on-and-off prednisone over the years.  Hehas tried Entocort with no improvement in his symptoms.  He had been on Humira in the past with some question of improvement; however, he and his family all deny that the Humira helped him at all. That was back in 2013.     DRUG MONITORING  TPMT enzyme activity: 22.7, normal     6-TGN/6-MMPN levels: --     Biologic concentration:--    DISEASE ASSESSMENT  Labs  Recent Labs   Lab Test  09/05/18   1537  07/12/17   1316   CRP  23.0*  <2.9   SED  12  7     Endoscopic assessment: flex sig 7/19/17 showed overall appearance  of UC, Ibrahim 1-2 (at most) in rectum (distal 5-10cm oc colon) and mild erythema in sigmoid. Polyp at splenic flexure shows TA with paneth cell metaplasia, other bx of descending, sigmoid and rectum show chronic changes, no active inflammation.  Enterography: 10/2016 MRE normal  Fecal calprotectin: --   C diff: --    ASSESSMENT/PLAN:  Fabian is a 24 YO M with an original diagnosis of Crohn's Disease (possible duodenal, ileal and colon) however we do not have original endoscopy reports. His scope in 2007 showed rectosigmoid inflammation. When first seen here in 2016 we discussed the need to restage his disease with EGD, colonoscopy and MRE to determine what part of his GI tract is involved and to what degree. Unfortunately due to his phobia of sedation/anesthesia we have not been able to do this. An MRE from 2016 showed no small bowel or other bowel inflammation (though he had been on a steroid taper leading up to that imaging). He eventually agreed to an unsedated flex sig which showed more pattern of ulcerative proctosigmoiditis (though the right colon and ileum were obviously not seen).    We again discussed the need to re-stage the disease. He thinks he can do an unsedated colonoscopy and I agree. We will work to schedule this. He doesn't think he can tolerate now with how inflamed he feels. We do have evidence of active inflammation with elevated inflammatory markers. We will check a MRE now (before he is on significant steroids) to look for any evidence of small bowel inflammation. Check c diff (never been positive) and fecal calprotectin (a non-invasive marker of inflammation that can be followed in lieu of endoscopic evaluation in the future). We will start prednisone 40 mg x 2 weeks and then taper. In 2 months we will get an unsedated colonoscopy. He will continue the pentasa. He will also try rowasa enemas nightly instead of cortenemas (this may have worked better than cortenemas in the past).    If he turns  out to have ulcerative colitis he may be able to maintained on mesalamine therapy (oral +/- rectal) if he actually STAYS on it. If he is found to still have findings more suggestive of Crohn's Disease then escalation of therapy to biologic will likely be indicated. Likewise, if he has UC pattern but is not able to be controlled on mesalamine therapy (when taking reliably) then escalation of therapy will need to be considered.    We discussed the importance of taking medications as discussed and ensuring follow up even when doing well to prevent flares in the future. He understands and agrees.    1. Inflammatory Bowel disease - as above  -labs today  -c diff and fecal calprotectin  -MRE  -prednisone taper  -pentasa 2000 mg PO BID  -Rowasa enemas nightly  -unsedated colonoscopy in 2 months  -notify clinic if symptoms do not improve as expected     2. Depression and anxiety - this is significantly limiting his ability to follow recommendations, to fully evaluate his disease and to care for him appropriately. He does understand this. I recommend he meet with Dr. LARRY Pak to discuss. He agrees to meet with her.    3. Splenic flexure tubular adenoma - needs full colonoscopy. Ordered. Unsedated.    IBD Health Care Maintenance:    Vaccinations:  -- Influenza (every year): Recommend yearly  -- TdaP (every 10 years): Last given 2009  -- Pneumococcal Pneumonia (once then every 5 years): Has not received     One time confirmation of immunity or serologies:  -- Hepatitis A (serologies or immunizations): 2009  -- Hepatitis B (serologies or immunizations): 1997, serologies show immunity  -- Zoster vaccination (>59 yo, discuss if over 50): has not received  -- MMR:2002  -- HPV (all aged 18-26): has not received  -- Meningococcal meningitis (all patients at risk for meningitis): 2009      Bone mineral density screening   -- Recommend all patients supplement with calcium and vitamin D  -- Given prior steroid use recommend DEXA if not  already done     Cancer Screening:  Colon cancer screening:  Unclear colonic involvement, but if >1/3 of the colon, recommend screening every 2-3 years. Has not had a full colonoscopy since 2007.      Cervical cancer screening: N/A     Skin cancer screening: Since patient is not immunocompromised, this is per patient's dermatologist recommendations.     Depression Screening:  -- Over the last month, have you felt down, depressed, or hopeless? Yes  -- Over the last month, have you felt little interest or pleasure doing things? Yes     Misc:  -- Avoid tobacco use  -- Avoid NSAIDs as there is potentially a 25% chance of causing an IBD flare     RTC 3 months with Dr. Harmon to establish care with new IBD provider. We discussed that I am leaving the [facility].    Thank you for this consultation.  It was a pleasure to participate in the care of this patient; please contact us with any further questions.     Estrada Whipple MD    [facility]  Division of Gastroenterology, Hepatology and Nutrition      HPI:   Today Fabian is here for a more urgent visit.    I last saw him for his flex sig in 7/2017. This showed Ibrahim 1-2 inflammation in the last 5-10 cm of the rectum and some erythema in the sigmoid (ibrahim 0-1). Biopsies showed chronic changes only.     He was supposed to continue his pentasa and take rowasa alternating with cortenemas. He was to continue the pentasa indefinitely and follow up in clinic.     After a week or so of the above meds he was feeling quite well. So he basically stopped all of his medications...again. He knows he shouldn't do this but he gets so wrapped up in everything else and he feels well he just stops.    He notes his symptoms again started becoming worse in the summer (June/July). He initially tried to ignore the symptoms but they did not improve. He started with looser stools, then more frequent and more urgent and then blood.    He was seen by Ari De  in clinic in September. Discussed importance of taking meds, following up, and also of checking the status of mucosa. He was to get an unsedated flex but he never scheduled. His labs did show an elevated CRP.     He was restarted on pentasa and was to take rowasa enemas and cortenemas. He restarted the pentasa but did not get the Rowasa. He tried the cortenemas with initially no improvement. He then stopped the cortenemas. In the last week he has restarted them and he thinks they may be helping him stop from getting worse but not improving anything.    He is having 6 urgent BMs per day. About 1 nocturnal stool per week. + tenesmus. Most BMs have blood. No abdominal pain other than some cramping before a BM.     No joint pains, mouth sores, eye pain, eye redness, jaundice, rashes, fevers or chills.    He and his family were upset that they felt the GI clinic was not letting him be seen again until he got his flex sig. We discussed that this was not the case and that we had tried to contact Fabian multiple times (see telephone messages and [employer] messages from FELISA Melgoza). Fabian never returned our calls - only his mom or dad did.    Fabian thinks he needs prednisone to help him respond right now.    ROS:    No fevers or chills  No weight loss  No blurry vision, double vision or change in vision  No sore throat  No lymphadenopathy  No headache, paraesthesias, or weakness in a limb  No shortness of breath or wheezing  No chest pain or pressure  No arthralgias or myalgias  No rashes or skin changes  No odynophagia or dysphagia  No BRBPR, hematochezia, melena  No dysuria, frequency or urgency  No hot/cold intolerance or polyria  No anxiety or depression    Extra intestinal manifestations of IBD:  No uveitis/episcleritis  No aphthous ulcers   No arthritis   No erythema nodosum/pyoderma gangrenosum.     PERTINENT PAST MEDICAL HISTORY:  Past Medical History:   Diagnosis Date     Crohn's disease (H)        PREVIOUS  SURGERIES:  None    PREVIOUS ENDOSCOPY:  As above    ALLERGIES:   No Known Allergies    PERTINENT MEDICATIONS:    Current Outpatient Prescriptions:      calcium carbonate (OS-HARLAN 500 MG [place]. [place]) 500 MG tablet, Take 2,000 mg by mouth 2 times daily, Disp: , Rfl:      Cholecalciferol (VITAMIN D) 2000 UNITS CAPS, Take 50,000 Units by mouth , Disp: , Rfl:      hydrocortisone (CORTENEMA) 100 MG/60ML enema, Place 1 enema rectally daily, Disp: 30 enema, Rfl: 3     mesalamine (PENTASA) 500 MG CPCR CR capsule, Take 1 capsule (500 mg) by mouth 4 times daily, Disp: 240 capsule, Rfl: 3     mesalamine (ROWASA) 4 g enema, Place 1 enema (4 g) rectally At Bedtime No more refills until clinic visit., Disp: 30 enema, Rfl: 3     Nutritional Supplements (ENSURE COMPLETE SHAKE PO), Take  by mouth., Disp: , Rfl:     SOCIAL HISTORY:  Social History     Social History     Marital status: Single     Spouse name: N/A     Number of children: N/A     Years of education: N/A     Occupational History     Not on file.     Social History Main Topics     Smoking status: Never Smoker     Smokeless tobacco: Never Used     Alcohol use No     Drug use: No     Sexual activity: Not on file     Other Topics Concern     Not on file     Social History Narrative       FAMILY HISTORY:  No family history on file.    Past/family/social history reviewed and no changes    PHYSICAL EXAMINATION:  Constitutional: aaox3, cooperative, pleasant, not dyspneic/diaphoretic, no acute distress  Vitals reviewed: /71  Pulse 85  Temp 98.6  F (37  C) (Oral)  Wt 72.7 kg (160 lb 3.2 oz)  SpO2 96%  BMI 23.66 kg/m2  Wt:   Wt Readings from Last 2 Encounters:   11/13/18 72.7 kg (160 lb 3.2 oz)   09/05/18 76.9 kg (169 lb 8 oz)      Eyes: Sclera anicteric/injected  Ears/nose/mouth/throat: Normal oropharynx without ulcers or exudate, mucus membranes moist, hearing intact  Neck: supple, thyroid normal size  CV: No edema  Respiratory: Unlabored breathing  Lymph: No axillary,  submandibular, supraclavicular or inguinal lymphadenopathy  Abd:  Nondistended, +bs, no hepatosplenomegaly, nontender, no peritoneal signs  Skin: warm, perfused, no jaundice  Psych: Normal affect  MSK: Normal gait      PERTINENT STUDIES:  Most recent CBC:  Recent Labs   Lab Test  09/05/18 1537  07/12/17   1316   WBC  14.0*  6.2   HGB  16.5  17.5   HCT  49.4  50.7   PLT  358  315     Most recent hepatic panel:  Recent Labs   Lab Test  09/05/18 1537 07/12/17   1316   ALT  22  29   AST  14  15     Most recent creatinine:  Recent Labs   Lab Test  09/05/18 1537 07/12/17   1316   CR  1.17  1.10             Answers for HPI/ROS submitted by the patient on 11/13/2018   General Symptoms: No  Skin Symptoms: No  HENT Symptoms: No  EYE SYMPTOMS: No  HEART SYMPTOMS: No  LUNG SYMPTOMS: No  INTESTINAL SYMPTOMS: Yes  URINARY SYMPTOMS: No  REPRODUCTIVE SYMPTOMS: No  SKELETAL SYMPTOMS: No  BLOOD SYMPTOMS: No  NERVOUS SYSTEM SYMPTOMS: No  MENTAL HEALTH SYMPTOMS: No  Heart burn or indigestion: No  Nausea: No  Vomiting: No  Abdominal pain: Yes  Bloating: No  Constipation: No  Diarrhea: Yes  Blood in stool: Yes  Black stools: No  Rectal or Anal pain: No  Fecal incontinence: Yes  Yellowing of skin or eyes: No  Vomit with blood: No  Change in stools: No  PHQ-2 Score: 2      Again, thank you for allowing me to participate in the care of your patient.      Sincerely,    Estrada Whipple MD

## 2018-11-13 NOTE — PATIENT INSTRUCTIONS
Nice to see you today      Labs today  Lab tests today at the 1st floor -- you will be notified of results by letter or my chart message in 7-10 days.  You will receive a phone call if more urgent follow up is needed.    Stool studies today  You may  your stool sample containers at Lab 1st Floor  before you leave today.  You may drop off the stool samples at any [facility]      Continue the Pentasa       Rowasa enemas at night    Prednisone taper     Schedule MRE  Nothing to eat or drink for 6 hours prior  Your are scheduled on November 28   Check in time 12 [address]   Room 1[address]327       Colonoscopy in 2 months with no sedation  You are scheduled on January 23   Check in  [address] [facility]  5th floor   You will be mailed the instructions  You will receive a call about one week from pre assessment nurse  Over instructions asnd sent your prep   Follow up after colonoscopy with Dr. Harmon      [facility] psychology referral      For questions regarding your care Monday through Friday, contact the RN GI care coordinator,  Call   [phone] . Your call will be  returned same day, or if consultation is needed with the provider, it may be following business day - or you may send a My Chart message.    For medication refills (prescribed by the GI clinic), contact your pharmacy.    For appointment rescheduling/cancellation, contact [phone]     After hours, or if you have an immediate GI concern and cannot wait for a return call, contact the GI Fellow at [phone] and select option #4.     Thanks Maude Nathan RN Care Coordinator for Dr. Whipple   Phone   [phone]

## 2018-11-13 NOTE — PROGRESS NOTES
IBD CLINIC VISIT    CC/REFERRING MD:  Referred Self    REASON FOR CONSULTATION: follow up IBD    IBD HISTORY  Age at diagnosis: age 8  Extent of disease: On initial endoscopies he had gastritis and some duodenitis with granulomas in duodenum, also has had ileitis and colitis. Exact extent of involvement in colon on diagnosis not available, although colonoscopy 2007 showed some inflammation in rectosigmoid. Follow up EGD showed no granulomas. 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  Disease phenotype: inflammatory  Isi-anal disease: None  Current CD medications:   -Pentasa 2000 mg PO BID - started again in September  -Cortenemas - has used intermittently since September - more recently nightly for the past few days  Prior IBD surgeries: None  Prior IBD Medications: He has been on Pentasa in the past, anywhere from 500 b.i.d. to 2000 mg p.o. b.i.d.  He has been on-and-off prednisone over the years.  Hehas tried Entocort with no improvement in his symptoms.  He had been on Humira in the past with some question of improvement; however, he and his family all deny that the Humira helped him at all. That was back in 2013.     DRUG MONITORING  TPMT enzyme activity: 22.7, normal     6-TGN/6-MMPN levels: --     Biologic concentration:--    DISEASE ASSESSMENT  Labs  Recent Labs   Lab Test  09/05/18   1537  07/12/17   1316   CRP  23.0*  <2.9   SED  12  7     Endoscopic assessment: flex sig 7/19/17 showed overall appearance of UC, Ibrahim 1-2 (at most) in rectum (distal 5-10cm oc colon) and mild erythema in sigmoid. Polyp at splenic flexure shows TA with paneth cell metaplasia, other bx of descending, sigmoid and rectum show chronic changes, no active inflammation.  Enterography: 10/2016 MRE normal  Fecal calprotectin: --   C  diff: --    ASSESSMENT/PLAN:  Fabian is a 22 YO M with an original diagnosis of Crohn's Disease (possible duodenal, ileal and colon) however we do not have original endoscopy reports. His scope in 2007 showed rectosigmoid inflammation. When first seen here in 2016 we discussed the need to restage his disease with EGD, colonoscopy and MRE to determine what part of his GI tract is involved and to what degree. Unfortunately due to his phobia of sedation/anesthesia we have not been able to do this. An MRE from 2016 showed no small bowel or other bowel inflammation (though he had been on a steroid taper leading up to that imaging). He eventually agreed to an unsedated flex sig which showed more pattern of ulcerative proctosigmoiditis (though the right colon and ileum were obviously not seen).    We again discussed the need to re-stage the disease. He thinks he can do an unsedated colonoscopy and I agree. We will work to schedule this. He doesn't think he can tolerate now with how inflamed he feels. We do have evidence of active inflammation with elevated inflammatory markers. We will check a MRE now (before he is on significant steroids) to look for any evidence of small bowel inflammation. Check c diff (never been positive) and fecal calprotectin (a non-invasive marker of inflammation that can be followed in lieu of endoscopic evaluation in the future). We will start prednisone 40 mg x 2 weeks and then taper. In 2 months we will get an unsedated colonoscopy. He will continue the pentasa. He will also try rowasa enemas nightly instead of cortenemas (this may have worked better than cortenemas in the past).    If he turns out to have ulcerative colitis he may be able to maintained on mesalamine therapy (oral +/- rectal) if he actually STAYS on it. If he is found to still have findings more suggestive of Crohn's Disease then escalation of therapy to biologic will likely be indicated. Likewise, if he has UC pattern but is not  able to be controlled on mesalamine therapy (when taking reliably) then escalation of therapy will need to be considered.    We discussed the importance of taking medications as discussed and ensuring follow up even when doing well to prevent flares in the future. He understands and agrees.    1. Inflammatory Bowel disease - as above  -labs today  -c diff and fecal calprotectin  -MRE  -prednisone taper  -pentasa 2000 mg PO BID  -Rowasa enemas nightly  -unsedated colonoscopy in 2 months  -notify clinic if symptoms do not improve as expected     2. Depression and anxiety - this is significantly limiting his ability to follow recommendations, to fully evaluate his disease and to care for him appropriately. He does understand this. I recommend he meet with Dr. LARRY Pak to discuss. He agrees to meet with her.    3. Splenic flexure tubular adenoma - needs full colonoscopy. Ordered. Unsedated.    IBD Health Care Maintenance:    Vaccinations:  -- Influenza (every year): Recommend yearly  -- TdaP (every 10 years): Last given 2009  -- Pneumococcal Pneumonia (once then every 5 years): Has not received     One time confirmation of immunity or serologies:  -- Hepatitis A (serologies or immunizations): 2009  -- Hepatitis B (serologies or immunizations): 1997, serologies show immunity  -- Zoster vaccination (>61 yo, discuss if over 50): has not received  -- MMR:2002  -- HPV (all aged 18-26): has not received  -- Meningococcal meningitis (all patients at risk for meningitis): 2009      Bone mineral density screening   -- Recommend all patients supplement with calcium and vitamin D  -- Given prior steroid use recommend DEXA if not already done     Cancer Screening:  Colon cancer screening:  Unclear colonic involvement, but if >1/3 of the colon, recommend screening every 2-3 years. Has not had a full colonoscopy since 2007.      Cervical cancer screening: N/A     Skin cancer screening: Since patient is not immunocompromised, this is  per patient's dermatologist recommendations.     Depression Screening:  -- Over the last month, have you felt down, depressed, or hopeless? Yes  -- Over the last month, have you felt little interest or pleasure doing things? Yes     Misc:  -- Avoid tobacco use  -- Avoid NSAIDs as there is potentially a 25% chance of causing an IBD flare     RTC 3 months with Dr. Harmon to establish care with new IBD provider. We discussed that I am leaving the [facility].    Thank you for this consultation.  It was a pleasure to participate in the care of this patient; please contact us with any further questions.     Estrada Whipple MD    [facility]  Division of Gastroenterology, Hepatology and Nutrition      HPI:   Today Fabian is here for a more urgent visit.    I last saw him for his flex sig in 7/2017. This showed Ibrahim 1-2 inflammation in the last 5-10 cm of the rectum and some erythema in the sigmoid (ibrahim 0-1). Biopsies showed chronic changes only.     He was supposed to continue his pentasa and take rowasa alternating with cortenemas. He was to continue the pentasa indefinitely and follow up in clinic.     After a week or so of the above meds he was feeling quite well. So he basically stopped all of his medications...again. He knows he shouldn't do this but he gets so wrapped up in everything else and he feels well he just stops.    He notes his symptoms again started becoming worse in the summer (June/July). He initially tried to ignore the symptoms but they did not improve. He started with looser stools, then more frequent and more urgent and then blood.    He was seen by Ari De in clinic in September. Discussed importance of taking meds, following up, and also of checking the status of mucosa. He was to get an unsedated flex but he never scheduled. His labs did show an elevated CRP.     He was restarted on pentasa and was to take rowasa enemas and cortenemas. He restarted the  pentasa but did not get the Rowasa. He tried the cortenemas with initially no improvement. He then stopped the cortenemas. In the last week he has restarted them and he thinks they may be helping him stop from getting worse but not improving anything.    He is having 6 urgent BMs per day. About 1 nocturnal stool per week. + tenesmus. Most BMs have blood. No abdominal pain other than some cramping before a BM.     No joint pains, mouth sores, eye pain, eye redness, jaundice, rashes, fevers or chills.    He and his family were upset that they felt the GI clinic was not letting him be seen again until he got his flex sig. We discussed that this was not the case and that we had tried to contact Fabian multiple times (see telephone messages and [employer] messages from FELISA Melgoza). Fabian never returned our calls - only his mom or dad did.    Fabian thinks he needs prednisone to help him respond right now.    ROS:    No fevers or chills  No weight loss  No blurry vision, double vision or change in vision  No sore throat  No lymphadenopathy  No headache, paraesthesias, or weakness in a limb  No shortness of breath or wheezing  No chest pain or pressure  No arthralgias or myalgias  No rashes or skin changes  No odynophagia or dysphagia  No BRBPR, hematochezia, melena  No dysuria, frequency or urgency  No hot/cold intolerance or polyria  No anxiety or depression    Extra intestinal manifestations of IBD:  No uveitis/episcleritis  No aphthous ulcers   No arthritis   No erythema nodosum/pyoderma gangrenosum.     PERTINENT PAST MEDICAL HISTORY:  Past Medical History:   Diagnosis Date     Crohn's disease (H)        PREVIOUS SURGERIES:  None    PREVIOUS ENDOSCOPY:  As above    ALLERGIES:   No Known Allergies    PERTINENT MEDICATIONS:    Current Outpatient Prescriptions:      calcium carbonate (OS-HARLAN 500 MG [place]. [place]) 500 MG tablet, Take 2,000 mg by mouth 2 times daily, Disp: , Rfl:      Cholecalciferol (VITAMIN D) 2000 UNITS CAPS, Take  50,000 Units by mouth , Disp: , Rfl:      hydrocortisone (CORTENEMA) 100 MG/60ML enema, Place 1 enema rectally daily, Disp: 30 enema, Rfl: 3     mesalamine (PENTASA) 500 MG CPCR CR capsule, Take 1 capsule (500 mg) by mouth 4 times daily, Disp: 240 capsule, Rfl: 3     mesalamine (ROWASA) 4 g enema, Place 1 enema (4 g) rectally At Bedtime No more refills until clinic visit., Disp: 30 enema, Rfl: 3     Nutritional Supplements (ENSURE COMPLETE SHAKE PO), Take  by mouth., Disp: , Rfl:     SOCIAL HISTORY:  Social History     Social History     Marital status: Single     Spouse name: N/A     Number of children: N/A     Years of education: N/A     Occupational History     Not on file.     Social History Main Topics     Smoking status: Never Smoker     Smokeless tobacco: Never Used     Alcohol use No     Drug use: No     Sexual activity: Not on file     Other Topics Concern     Not on file     Social History Narrative       FAMILY HISTORY:  No family history on file.    Past/family/social history reviewed and no changes    PHYSICAL EXAMINATION:  Constitutional: aaox3, cooperative, pleasant, not dyspneic/diaphoretic, no acute distress  Vitals reviewed: /71  Pulse 85  Temp 98.6  F (37  C) (Oral)  Wt 72.7 kg (160 lb 3.2 oz)  SpO2 96%  BMI 23.66 kg/m2  Wt:   Wt Readings from Last 2 Encounters:   11/13/18 72.7 kg (160 lb 3.2 oz)   09/05/18 76.9 kg (169 lb 8 oz)      Eyes: Sclera anicteric/injected  Ears/nose/mouth/throat: Normal oropharynx without ulcers or exudate, mucus membranes moist, hearing intact  Neck: supple, thyroid normal size  CV: No edema  Respiratory: Unlabored breathing  Lymph: No axillary, submandibular, supraclavicular or inguinal lymphadenopathy  Abd:  Nondistended, +bs, no hepatosplenomegaly, nontender, no peritoneal signs  Skin: warm, perfused, no jaundice  Psych: Normal affect  MSK: Normal gait      PERTINENT STUDIES:  Most recent CBC:  Recent Labs   Lab Test  09/05/18   1537  07/12/17   1316   WBC   14.0*  6.2   HGB  16.5  17.5   HCT  49.4  50.7   PLT  358  315     Most recent hepatic panel:  Recent Labs   Lab Test  09/05/18   1537  07/12/17   1316   ALT  22  29   AST  14  15     Most recent creatinine:  Recent Labs   Lab Test  09/05/18 1537  07/12/17   1316   CR  1.17  1.10             Answers for HPI/ROS submitted by the patient on 11/13/2018   General Symptoms: No  Skin Symptoms: No  HENT Symptoms: No  EYE SYMPTOMS: No  HEART SYMPTOMS: No  LUNG SYMPTOMS: No  INTESTINAL SYMPTOMS: Yes  URINARY SYMPTOMS: No  REPRODUCTIVE SYMPTOMS: No  SKELETAL SYMPTOMS: No  BLOOD SYMPTOMS: No  NERVOUS SYSTEM SYMPTOMS: No  MENTAL HEALTH SYMPTOMS: No  Heart burn or indigestion: No  Nausea: No  Vomiting: No  Abdominal pain: Yes  Bloating: No  Constipation: No  Diarrhea: Yes  Blood in stool: Yes  Black stools: No  Rectal or Anal pain: No  Fecal incontinence: Yes  Yellowing of skin or eyes: No  Vomit with blood: No  Change in stools: No  PHQ-2 Score: 2

## 2018-11-13 NOTE — MR AVS SNAPSHOT
After Visit Summary   11/13/2018    Ferdinand Anglin    MRN: [id number]           Patient Information     Date Of Birth          1995        Visit Information        Provider Department      11/13/2018 12:00 PM Estrada Whipple MD [facility]        Today's Diagnoses     Crohn's disease (H)    -  1      Care Instructions    Nice to see you today      Labs today  Lab tests today at the 1st floor -- you will be notified of results by letter or my chart message in 7-10 days.  You will receive a phone call if more urgent follow up is needed.    Stool studies today  You may  your stool sample containers at Lab 1st Floor  before you leave today.  You may drop off the stool samples at any [facility]      Continue the Pentasa       Rowasa enemas at night    Prednisone taper     Schedule MRE  Nothing to eat or drink for 6 hours prior  Your are scheduled on November 28   Check in time 12 [address]   Room 1-327       Colonoscopy in 2 months with no sedation  You are scheduled on January 23   Check in  [address] [place] [address]  5th floor   You will be mailed the instructions  You will receive a call about one week from pre assessment nurse  Over instructions asnd sent your prep   Follow up after colonoscopy with Dr. Harmon      [facility] psychology referral      For questions regarding your care Monday through Friday, contact the RN GI care coordinator,  Call   [phone] . Your call will be  returned same day, or if consultation is needed with the provider, it may be following business day - or you may send a My Chart message.    For medication refills (prescribed by the GI clinic), contact your pharmacy.    For appointment rescheduling/cancellation, contact [phone]     After hours, or if you have an immediate GI concern and cannot wait for a return call, contact the GI Fellow at [phone] and select option #4.     Thanks Maude  Venita RN Care Coordinator for Dr. Whipple   Phone   [phone]                 Follow-ups after your visit        Additional Services     GASTROENTEROLOGY ADULT REF PROCEDURE ONLY [facility] [phone]       Last Lab Result: Creatinine (mg/dL)       Date                     Value                 09/05/2018               1.17             ----------  Body mass index is 23.66 kg/(m^2).     Needed:  No  Language:  English    Patient will be contacted to schedule procedure.     Please be aware that coverage of these services is subject to the terms and limitations of your health insurance plan.  Call member services at your health plan with any benefit or coverage questions.  Any procedures must be performed at a [place] facility OR coordinated by your clinic's referral office.    Please bring the following with you to your appointment:    (1) Any X-Rays, CTs or MRIs which have been performed.  Contact the facility where they were done to arrange for  prior to your scheduled appointment.    (2) List of current medications   (3) This referral request   (4) Any documents/labs given to you for this referral                  Your next 10 appointments already scheduled     Nov 28, 2018  1:00 PM CST   MR ENTEROGRAPHY WWO CONTRAST with UUMR1   [facility], [place], MRI ([facility])    [address] [address] [phone]           How do I prepare for my exam? (Food and drink instructions) Do not eat or drink for 6 hours before the exam. If you need to take medicine, you may take it with small sips of water. (We may ask you to take liquid medicine as well.)  How do I prepare for my exam? (Other instructions) Take your medicines as usual, unless your doctor tells you not to. You may or may not receive intravenous (IV) contrast for this exam pending the discretion of the Radiologist. You will be asked to drink oral contrast for  this exam. You will be given the oral contrast in MRI prior to your exam. Please arrive 60-90 minutes before your exam time to drink the oral contrast.  What should I wear: The MRI machine uses a strong magnet. Please wear clothes without metal (snaps, zippers). A sweatsuit works well, or we may give you a hospital gown. Please remove any body piercings and hair extensions before you arrive. You will also remove watches, jewelry, hairpins, wallets, dentures, partial dental plates and hearing aids. You may wear contact lenses, and you may be able to wear your rings. We have a safe place to keep your personal items, but it is safer to leave them at home.  How long does the exam take: Most tests take 30 to 60 minutes.  What should I bring: Bring a list of your current medicines to your exam (including vitamins, minerals and over-the-counter drugs).  Do I need a :  No  is needed.  What should I do after the exam: No Restrictions, You may resume normal activities.  What is this test: MRI (magnetic resonance imaging) uses a strong magnet and radio waves to look inside the body. An MRA (magnetic resonance angiogram) does the same thing, but it lets us look at your blood vessels. A computer turns the radio waves into pictures showing cross sections of the body, much like slices of bread. This helps us see any problems more clearly. You may receive fluid (called  contrast ) before or during your scan. The fluid helps us see the pictures better. We give the fluid through an IV (small needle in your arm).  Who should I call with questions: If you have any questions, please contact your Imaging Department exam site. Directions, parking instructions, and other information is available on our website, [email or web address].            Jan 23, 2019   Procedure with Jasvir Harmon MD   [facility] ([facility])    [address]  5th Floor  [facility]  [id number] [phone]           Located in the Clinics and Surgery Center at [address] [place] [address], [place] [address].   parking is very convenient and highly recommended.  is a $6 flat rate fee.  Both  and self parkers should enter the main arrival plaza from [facility]; parking attendants will direct you based on your parking preference.            Feb 04, 2019  1:00 PM CST   (Arrive by 12:45 PM)   RETURN INFLAMMATORY BOWEL DISEASE with Jasvir Harmon MD   [facility] ([facility])    [address] [place] [address]  4th Floor  [facility] [address] [phone]              Future tests that were ordered for you today     Open Future Orders        Priority Expected Expires Ordered    MR Enterography w Contrast Routine  11/13/2019 11/13/2018    CRP inflammation Routine  11/13/2019 11/13/2018    CBC with platelets differential Routine  11/13/2019 11/13/2018    Erythrocyte sedimentation rate auto Routine  11/13/2019 11/13/2018    Hepatic panel Routine  11/13/2019 11/13/2018    Clostridium difficile toxin B PCR Routine 11/13/2018 2/11/2019 11/13/2018    Calprotectin Feces Routine  11/13/2019 11/13/2018            Who to contact     Please call your clinic at [phone] to:    Ask questions about your health    Make or cancel appointments    Discuss your medicines    Learn about your test results    Speak to your doctor            Additional Information About Your Visit        [employer] Information     [employer] gives you secure access to your electronic health record. If you see a primary care provider, you can also send messages to your care team and make appointments. If you have questions, please call your primary care clinic.  If you do not have a primary care provider, please call [phone] and they will assist you.      [employer] is an electronic gateway that provides easy, online access to your medical records. With [employer], you can  request a clinic appointment, read your test results, renew a prescription or communicate with your care team.     To access your existing account, please contact your [facility] or call [phone] for assistance.        Care EveryWhere ID     This is your Care EveryWhere ID. This could be used by other organizations to access your [facility] medical records  [id number]        Your Vitals Were     Pulse Temperature Pulse Oximetry BMI (Body Mass Index)          85 98.6  F (37  C) (Oral) 96% 23.66 kg/m2         Blood Pressure from Last 3 Encounters:   11/13/18 133/71   09/05/18 134/78   07/10/17 128/71    Weight from Last 3 Encounters:   11/13/18 72.7 kg (160 lb 3.2 oz)   09/05/18 76.9 kg (169 lb 8 oz)   07/10/17 76 kg (167 lb 9.6 oz)              We Performed the Following     GASTROENTEROLOGY ADULT REF PROCEDURE ONLY [facility] [phone]          Today's Medication Changes          These changes are accurate as of 11/13/18  1:12 PM.  If you have any questions, ask your nurse or doctor.               Start taking these medicines.        Dose/Directions    predniSONE 5 MG tablet   Commonly known as:  DELTASONE   Used for:  Crohn's disease (H)   Started by:  Estrada Whipple MD        Take 8 tabs daily (40 mg) for 2 weeks, then decrease by 2 tabs daily (10 mg) every week until at 4 tabs daily (20 mg), then decrease by 1 tab daily (5 mg) every week until 1 tablet daily (5 mg) then 1 tablet (5mg) every other day for 1 week   Quantity:  228 tablet   Refills:  0         These medicines have changed or have updated prescriptions.        Dose/Directions    * mesalamine 500 MG Cpcr CR capsule   Commonly known as:  PENTASA   This may have changed:  Another medication with the same name was added. Make sure you understand how and when to take each.   Used for:  Crohn's disease of both small and large intestine with rectal bleeding (H)   Changed by:  Estrada Whipple,  MD        Dose:  500 mg   Take 1 capsule (500 mg) by mouth 4 times daily   Quantity:  240 capsule   Refills:  3       * mesalamine 4 g enema   Commonly known as:  ROWASA   This may have changed:  Another medication with the same name was added. Make sure you understand how and when to take each.   Used for:  Crohn's disease of both small and large intestine with rectal bleeding (H)   Changed by:  Estrada Whipple MD        Dose:  4 g   Place 1 enema (4 g) rectally At Bedtime No more refills until clinic visit.   Quantity:  30 enema   Refills:  3       * mesalamine 4 g enema   Commonly known as:  ROWASA   This may have changed:  You were already taking a medication with the same name, and this prescription was added. Make sure you understand how and when to take each.   Used for:  Crohn's disease (H)   Changed by:  Estrada Whipple MD        Dose:  4 g   Place 1 enema (4 g) rectally At Bedtime   Quantity:  30 Bottle   Refills:  0       * Notice:  This list has 3 medication(s) that are the same as other medications prescribed for you. Read the directions carefully, and ask your doctor or other care provider to review them with you.         Where to get your medicines      These medications were sent to [id number] - [place], [place] - [address] AT [facility] [place] [facility] [address]  [address], [place] [address]     Phone:  [phone]     mesalamine 4 g enema         Some of these will need a paper prescription and others can be bought over the counter.  Ask your nurse if you have questions.     Bring a paper prescription for each of these medications     predniSONE 5 MG tablet                Primary Care Provider Office Phone # Fax #    Sandi Holloway [phone]       [facility]  [address] [place] [facility]  [place] [address]        Equal Access to Services     RUPERT DUNBAR AH: ryan Montejo, anjel  denise abreuin hayaatrang lola ayala ah. So [facility] [phone].    ATENCIÓN: Si kishor swain, tiene a roberto disposición servicios gratuitos de asistencia lingüística. Imelda al [phone].    We comply with applicable federal civil rights laws and [place] laws. We do not discriminate on the basis of race, color, national origin, age, disability, sex, sexual orientation, or gender identity.            Thank you!     Thank you for choosing [facility]  for your care. Our goal is always to provide you with excellent care. Hearing back from our patients is one way we can continue to improve our services. Please take a few minutes to complete the written survey that you may receive in the mail after your visit with us. Thank you!             Your Updated Medication List - Protect others around you: Learn how to safely use, store and throw away your medicines at www.disposemymeds.org.          This list is accurate as of 11/13/18  1:12 PM.  Always use your most recent med list.                   Brand Name Dispense Instructions for use Diagnosis    calcium carbonate 500 mg (elemental) 500 MG tablet    OS-HARLAN     Take 2,000 mg by mouth 2 times daily        ENSURE COMPLETE SHAKE PO      Take  by mouth.        hydrocortisone 100 MG/60ML enema    CORTENEMA    30 enema    Place 1 enema rectally daily    Crohn's disease of both small and large intestine with rectal bleeding (H)       * mesalamine 500 MG Cpcr CR capsule    PENTASA    240 capsule    Take 1 capsule (500 mg) by mouth 4 times daily    Crohn's disease of both small and large intestine with rectal bleeding (H)       * mesalamine 4 g enema    ROWASA    30 enema    Place 1 enema (4 g) rectally At Bedtime No more refills until clinic visit.    Crohn's disease of both small and large intestine with rectal bleeding (H)       * mesalamine 4 g enema    ROWASA    30 Bottle    Place 1 enema (4 g) rectally At Bedtime    Crohn's  disease (H)       predniSONE 5 MG tablet    DELTASONE    228 tablet    Take 8 tabs daily (40 mg) for 2 weeks, then decrease by 2 tabs daily (10 mg) every week until at 4 tabs daily (20 mg), then decrease by 1 tab daily (5 mg) every week until 1 tablet daily (5 mg) then 1 tablet (5mg) every other day for 1 week    Crohn's disease (H)       vitamin D 2000 units Caps      Take 50,000 Units by mouth        * Notice:  This list has 3 medication(s) that are the same as other medications prescribed for you. Read the directions carefully, and ask your doctor or other care provider to review them with you.

## 2018-11-14 DIAGNOSIS — K50.90 CROHN'S DISEASE (H): ICD-10-CM

## 2018-11-14 LAB
BASOPHILS # BLD AUTO: 0 10E9/L (ref 0–0.2)
BASOPHILS NFR BLD AUTO: 0.2 %
CRP SERPL-MCNC: 40 MG/L (ref 0–8)
DIFFERENTIAL METHOD BLD: ABNORMAL
EOSINOPHIL # BLD AUTO: 0.6 10E9/L (ref 0–0.7)
EOSINOPHIL NFR BLD AUTO: 4.7 %
ERYTHROCYTE [DISTWIDTH] IN BLOOD BY AUTOMATED COUNT: 12.6 % (ref 10–15)
ERYTHROCYTE [SEDIMENTATION RATE] IN BLOOD BY WESTERGREN METHOD: 38 MM/H (ref 0–15)
HCT VFR BLD AUTO: 46.4 % (ref 40–53)
HGB BLD-MCNC: 15.8 G/DL (ref 13.3–17.7)
LYMPHOCYTES # BLD AUTO: 2.6 10E9/L (ref 0.8–5.3)
LYMPHOCYTES NFR BLD AUTO: 20.6 %
MCH RBC QN AUTO: 30.3 PG (ref 26.5–33)
MCHC RBC AUTO-ENTMCNC: 34.1 G/DL (ref 31.5–36.5)
MCV RBC AUTO: 89 FL (ref 78–100)
MONOCYTES # BLD AUTO: 0.8 10E9/L (ref 0–1.3)
MONOCYTES NFR BLD AUTO: 6.6 %
NEUTROPHILS # BLD AUTO: 8.6 10E9/L (ref 1.6–8.3)
NEUTROPHILS NFR BLD AUTO: 67.9 %
PLATELET # BLD AUTO: 460 10E9/L (ref 150–450)
RBC # BLD AUTO: 5.21 10E12/L (ref 4.4–5.9)
WBC # BLD AUTO: 12.7 10E9/L (ref 4–11)

## 2018-11-14 PROCEDURE — 85652 RBC SED RATE AUTOMATED: CPT | Performed by: INTERNAL MEDICINE

## 2018-11-14 PROCEDURE — 36415 COLL VENOUS BLD VENIPUNCTURE: CPT | Performed by: INTERNAL MEDICINE

## 2018-11-14 PROCEDURE — 85025 COMPLETE CBC W/AUTO DIFF WBC: CPT | Performed by: INTERNAL MEDICINE

## 2018-11-14 PROCEDURE — 80076 HEPATIC FUNCTION PANEL: CPT | Performed by: INTERNAL MEDICINE

## 2018-11-14 PROCEDURE — 86140 C-REACTIVE PROTEIN: CPT | Performed by: INTERNAL MEDICINE

## 2018-11-14 RX ORDER — MESALAMINE 500 MG/1
2000 CAPSULE, EXTENDED RELEASE ORAL 2 TIMES DAILY
Qty: 240 CAPSULE | Refills: 11 | Status: SHIPPED | OUTPATIENT
Start: 2018-11-14 | End: 2019-02-04 | Stop reason: ALTCHOICE

## 2018-11-15 ENCOUNTER — PATIENT OUTREACH (OUTPATIENT)
Dept: GASTROENTEROLOGY | Facility: CLINIC | Age: 23
End: 2018-11-15

## 2018-11-15 LAB
ALBUMIN SERPL-MCNC: 3.4 G/DL (ref 3.4–5)
ALP SERPL-CCNC: 70 U/L (ref 40–150)
ALT SERPL W P-5'-P-CCNC: 21 U/L (ref 0–70)
AST SERPL W P-5'-P-CCNC: 18 U/L (ref 0–45)
BILIRUB DIRECT SERPL-MCNC: <0.1 MG/DL (ref 0–0.2)
BILIRUB SERPL-MCNC: 0.3 MG/DL (ref 0.2–1.3)
PROT SERPL-MCNC: 8.1 G/DL (ref 6.8–8.8)

## 2018-11-15 NOTE — PROGRESS NOTES
Called and left a message that he should be taking pentasa twice a day. Asked pt to my chart or leave a message that he has received this message.  Left my name and number. Also sent a my chart message.     He should be on Pentasa 2000 mg pO BID. I changed his script.     Please make sure he is taking this dose. It is higher than previous scrip     J

## 2018-11-26 ENCOUNTER — MYC MEDICAL ADVICE (OUTPATIENT)
Dept: GASTROENTEROLOGY | Facility: CLINIC | Age: 23
End: 2018-11-26

## 2018-11-27 ENCOUNTER — MEDICAL CORRESPONDENCE (OUTPATIENT)
Dept: HEALTH INFORMATION MANAGEMENT | Facility: CLINIC | Age: 23
End: 2018-11-27

## 2018-11-27 NOTE — TELEPHONE ENCOUNTER
[facility] Call Center    Phone Message    May a detailed message be left on voicemail: no    Reason for Call: Other: Pt's father calling to follow up  on the [employer] message sent by Ferdinand on 11/26.  He needs the form that was attached to the message filled out and  mailed back to his home as soon as possible.  Please give either Ferdinand or Alexx a call back when it is completed.     Action Taken: Message routed to:  [facility] MEGHANA MERIDA

## 2018-11-28 NOTE — TELEPHONE ENCOUNTER
Called to inform pt that the form was signed by Dr. Whipple and faxed to [employer]ilia.  Left a vm and also  a my chart message.

## 2018-11-29 ENCOUNTER — TELEPHONE (OUTPATIENT)
Dept: GASTROENTEROLOGY | Facility: CLINIC | Age: 23
End: 2018-11-29

## 2018-11-29 NOTE — TELEPHONE ENCOUNTER
[facility] Call Center    Phone Message    May a detailed message be left on voicemail: yes    Reason for Call: Other: Patient's father Alexx is calling in regarding the form he needs  to fill out. I advised him of the last message stating they faxed it and he was upset he stated they were not suppose to fax it they were suppose to mail it to the patient's home because when it was faxed they only got the first page. He stated they need this asap mailed to their house he said he has tried 3 times to get this form. Please follow up with the patient's father and let him know when this is in the mail. Thank you.    Action Taken: Message routed to:  [facility] FUAD

## 2018-11-30 DIAGNOSIS — K50.111 CROHN'S DISEASE OF LARGE INTESTINE WITH RECTAL BLEEDING (H): ICD-10-CM

## 2018-11-30 LAB
C DIFF TOX B STL QL: NEGATIVE
SPECIMEN SOURCE: NORMAL

## 2018-11-30 PROCEDURE — 87493 C DIFF AMPLIFIED PROBE: CPT | Performed by: INTERNAL MEDICINE

## 2018-11-30 PROCEDURE — 83993 ASSAY FOR CALPROTECTIN FECAL: CPT | Performed by: INTERNAL MEDICINE

## 2018-11-30 NOTE — TELEPHONE ENCOUNTER
Called and spoke to patient's father, Alexx advising him 2nd part of [employer] form was mailed to home address 11/27/18 and once received and completed, may fax back to gastroenterology. Advised left message on patient's cell phone of this. Father stated understanding and thankful for call.

## 2018-12-03 ENCOUNTER — PATIENT OUTREACH (OUTPATIENT)
Dept: GASTROENTEROLOGY | Facility: CLINIC | Age: 23
End: 2018-12-03

## 2018-12-03 NOTE — PROGRESS NOTES
Pt's father stated he did not received the page 1 form that was sent via postal service.    Sent via email and called father back to make sure received via email.  Also sent paper copy.

## 2018-12-04 LAB — CALPROTECTIN STL-MCNT: 1589.6 MG/KG (ref 0–49.9)

## 2018-12-05 ENCOUNTER — HOSPITAL ENCOUNTER (OUTPATIENT)
Dept: MRI IMAGING | Facility: CLINIC | Age: 23
Discharge: HOME | End: 2018-12-05
Attending: INTERNAL MEDICINE | Admitting: INTERNAL MEDICINE
Payer: COMMERCIAL

## 2018-12-05 DIAGNOSIS — K50.111 CROHN'S DISEASE OF LARGE INTESTINE WITH RECTAL BLEEDING (H): ICD-10-CM

## 2018-12-05 PROCEDURE — 25500064 ZZH RX 255 OP 636: Performed by: INTERNAL MEDICINE

## 2018-12-05 PROCEDURE — A9585 GADOBUTROL INJECTION: HCPCS | Performed by: INTERNAL MEDICINE

## 2018-12-05 PROCEDURE — 25000128 H RX IP 250 OP 636: Performed by: INTERNAL MEDICINE

## 2018-12-05 PROCEDURE — 74183 MRI ABD W/O CNTR FLWD CNTR: CPT

## 2018-12-05 RX ORDER — GADOBUTROL 604.72 MG/ML
7.5 INJECTION INTRAVENOUS ONCE
Status: COMPLETED | OUTPATIENT
Start: 2018-12-05 | End: 2018-12-05

## 2018-12-05 RX ADMIN — GLUCAGON HYDROCHLORIDE 1 MG: 1 INJECTION, POWDER, FOR SOLUTION INTRAMUSCULAR; INTRAVENOUS; SUBCUTANEOUS at 17:33

## 2018-12-05 RX ADMIN — SODIUM CHLORIDE 100 ML: 9 INJECTION, SOLUTION INTRAVENOUS at 17:56

## 2018-12-05 RX ADMIN — GADOBUTROL 7.5 ML: 604.72 INJECTION INTRAVENOUS at 17:56

## 2018-12-10 ENCOUNTER — PATIENT OUTREACH (OUTPATIENT)
Dept: GASTROENTEROLOGY | Facility: CLINIC | Age: 23
End: 2018-12-10

## 2018-12-10 NOTE — PROGRESS NOTES
Called pt per Dr. Whipple request. Pt in class and no privacy. Pt will send a my chart message with an update.          Can you touch base with Fabian to make sure he is feeling better.     If not feeling better will need to get in with Ari or Jasvir sooner to discuss escalation of therapy

## 2018-12-20 ENCOUNTER — TELEPHONE (OUTPATIENT)
Dept: GASTROENTEROLOGY | Facility: CLINIC | Age: 23
End: 2018-12-20

## 2018-12-20 NOTE — TELEPHONE ENCOUNTER
[facility] Call Center    Phone Message    May a detailed message be left on voicemail: yes    Reason for Call: Other: Ivania RN Case Manager with [employer] calling to f/u on a fax request she had sent over a few weeks ago. She had put it to Dr. Whipple's and Ari De's attention, as she had seen their names on the claim for pt. She states their company was working with pt due to his Crohn's, and they were wanting to get a copy of the treatment plan. She is sending another request over-her personal fax is [phone].    Action Taken: Message routed to:  [facility] ([facility]): Gastro Adult

## 2018-12-20 NOTE — TELEPHONE ENCOUNTER
Contacted  asking for reasoning for this request and need pt's signed request to send information related to the treatment plan.. Will await the form.

## 2019-01-15 ENCOUNTER — TELEPHONE (OUTPATIENT)
Dept: GASTROENTEROLOGY | Facility: CLINIC | Age: 24
End: 2019-01-15

## 2019-01-15 DIAGNOSIS — Z12.11 SPECIAL SCREENING FOR MALIGNANT NEOPLASMS, COLON: Primary | ICD-10-CM

## 2019-01-15 NOTE — TELEPHONE ENCOUNTER
Patient scheduled for colonoscopy    Indication for procedure. Surveillance of Crohn disease    Referring Provider. Estrada Whipple     ? no    Arrival time verified? 9:30 am     Facility location verified? [address]; [address]    Instructions given regarding prep and procedure    Prep Type goLytely     Are you taking any anticoagulants or blood thinners? no    Instructions given? Yes via my chart    Electronic implanted devices? no    Pre procedure teaching completed? Yes    Transportation from procedure? Patient has decided not to have sedation after discussion of procedure    H&P / Pre op physical completed? n/a

## 2019-01-22 ENCOUNTER — TELEPHONE (OUTPATIENT)
Dept: GASTROENTEROLOGY | Facility: CLINIC | Age: 24
End: 2019-01-22

## 2019-01-22 NOTE — TELEPHONE ENCOUNTER
Patient requested to have no sedation for the procedure after discussion of options for the procedure.

## 2019-01-23 ENCOUNTER — HOSPITAL ENCOUNTER (OUTPATIENT)
Facility: AMBULATORY SURGERY CENTER | Age: 24
End: 2019-01-23
Attending: INTERNAL MEDICINE
Payer: COMMERCIAL

## 2019-01-23 VITALS
SYSTOLIC BLOOD PRESSURE: 125 MMHG | DIASTOLIC BLOOD PRESSURE: 82 MMHG | OXYGEN SATURATION: 99 % | WEIGHT: 170 LBS | RESPIRATION RATE: 16 BRPM | TEMPERATURE: 99.4 F | HEIGHT: 69 IN | HEART RATE: 91 BPM | BODY MASS INDEX: 25.18 KG/M2

## 2019-01-23 RX ORDER — ONDANSETRON 2 MG/ML
4 INJECTION INTRAMUSCULAR; INTRAVENOUS
Status: DISCONTINUED | OUTPATIENT
Start: 2019-01-23 | End: 2019-01-24 | Stop reason: HOSPADM

## 2019-01-23 RX ORDER — SIMETHICONE
LIQUID (ML) MISCELLANEOUS PRN
Status: DISCONTINUED | OUTPATIENT
Start: 2019-01-23 | End: 2019-01-23 | Stop reason: HOSPADM

## 2019-01-23 RX ORDER — LIDOCAINE 40 MG/G
CREAM TOPICAL
Status: DISCONTINUED | OUTPATIENT
Start: 2019-01-23 | End: 2019-01-24 | Stop reason: HOSPADM

## 2019-01-23 ASSESSMENT — MIFFLIN-ST. JEOR: SCORE: 1766.49

## 2019-01-24 LAB
COLONOSCOPY: NORMAL
COPATH REPORT: NORMAL

## 2019-01-28 ENCOUNTER — TELEPHONE (OUTPATIENT)
Dept: GASTROENTEROLOGY | Facility: CLINIC | Age: 24
End: 2019-01-28

## 2019-02-04 ENCOUNTER — OFFICE VISIT (OUTPATIENT)
Dept: GASTROENTEROLOGY | Facility: CLINIC | Age: 24
End: 2019-02-04
Payer: COMMERCIAL

## 2019-02-04 VITALS
BODY MASS INDEX: 25.59 KG/M2 | TEMPERATURE: 98.4 F | RESPIRATION RATE: 18 BRPM | OXYGEN SATURATION: 97 % | HEART RATE: 76 BPM | HEIGHT: 69 IN | DIASTOLIC BLOOD PRESSURE: 70 MMHG | SYSTOLIC BLOOD PRESSURE: 133 MMHG | WEIGHT: 172.8 LBS

## 2019-02-04 DIAGNOSIS — K50.10 CROHN'S DISEASE OF LARGE INTESTINE WITHOUT COMPLICATION (H): Primary | ICD-10-CM

## 2019-02-04 DIAGNOSIS — K50.811 CROHN'S DISEASE OF BOTH SMALL AND LARGE INTESTINE WITH RECTAL BLEEDING (H): ICD-10-CM

## 2019-02-04 RX ORDER — MESALAMINE 1.2 G/1
4800 TABLET, DELAYED RELEASE ORAL EVERY MORNING
Qty: 180 TABLET | Refills: 3 | Status: SHIPPED | OUTPATIENT
Start: 2019-02-04 | End: 2019-06-21

## 2019-02-04 ASSESSMENT — ENCOUNTER SYMPTOMS
SKIN CHANGES: 0
POOR WOUND HEALING: 0
NAIL CHANGES: 0

## 2019-02-04 ASSESSMENT — MIFFLIN-ST. JEOR: SCORE: 1779.2

## 2019-02-04 ASSESSMENT — PAIN SCALES - GENERAL: PAINLEVEL: NO PAIN (0)

## 2019-02-04 NOTE — PROGRESS NOTES
IBD CLINIC VISIT    CC/REFERRING MD:  Referred Self    REASON FOR CONSULTATION: follow up IBD    IBD HISTORY  Age at diagnosis: age 8  Extent of disease: On initial endoscopies he had gastritis and some duodenitis with granulomas in duodenum, also has had ileitis and colitis. Exact extent of involvement in colon on diagnosis not available, although colonoscopy 2007 showed some inflammation in rectosigmoid. Follow up EGD showed no granulomas. 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 Colonoscopy 1/23/19 with active left sided inflammation with a CD score of 9. Biopsies with mild and minimally active chronic colitis with no dysplasia. Recently finished a prednisone taper and patient doing fairly well.   Disease phenotype: inflammatory  Isi-anal disease: None  Current CD medications:   -Pentasa 2000 mg PO BID - started again in September  -Cortrosarioas - has used intermittently since September - now nightly   Prior IBD surgeries: None  Prior IBD Medications: He has been on Pentasa in the past, anywhere from 500 b.i.d. to 2000 mg p.o. b.i.d.  He has been on-and-off prednisone over the years.  He has tried Entocort with no improvement in his symptoms.  He had been on Humira in the past with some question of improvement; however, he and his family all deny that the Humira helped him at all. That was back in 2013.     DRUG MONITORING  TPMT enzyme activity: 22.7, normal 9/16/16     6-TGN/6-MMPN levels: -- N/A     Biologic concentration:-- N/A    DISEASE ASSESSMENT  Labs  Recent Labs   Lab Test 11/14/18  1431 09/05/18  1537   CRP 40.0* 23.0*   SED 38* 12     Endoscopic assessment:            - Simple Endoscopic Score for Crohn's Disease: 9,                        mucosal inflammatory changes secondary to Crohn's                         disease with colonic involvement.                        - Continuous pseudopolyps from 50 to 70cm. Active                        disease mostly left sided (ulceration only noted in                        rectum).                        - One 3 mm polyp at 70 cm proximal to the anus, removed                        with a cold snare. Resected and retrieved.   Enterography: 2/5/18 MRE   IMPRESSION:   1. Active inflammation of the hepatic flexure, proximal to mid  transverse, descending, and sigmoid colon and upper rectum.  2. No active small bowel inflammation.    Fecal calprotectin: -- 1589 11/30/18  C diff: -- NEGATIVE 11/30/18    ASSESSMENT/PLAN:  Fabian is a 23 y.o male with likely diagnosis of Crohn's Disease (possible duodenal, ileal and colon) however we do not have original endoscopy reports. He is currently on Penatsa and Rowasa enemas. Patient recent colonoscopy showed mild to minimal chronic colitis with and endoscopic score of 9 mainly in the left sided colon. Patient currently reports 2-4 BM non-bloody and denies any other extraintestinal manifestations of IBD. His MRE also showed inflammation along the colon but normal small bowel. At this point we suspect he is in clinical remission with mild endoscopic disease on his current medication although we discussed the option of switching to Lialda given this medication has more effect in the colon rather than Pentasa mechanism of action is mainly in the small bowel. Patient also understood that if he flares up again he will likely required the need of Entyvio (biologic) and this will be a more long term medication option rather than prednisone. We will also repeat inflammatory markers today to have a better assessment of his baseline while not on a flare. After 8 weeks of lialda we also discussed the need of a fecal calprotectin to evaluate response. Patient agreed with the plan.  We discussed the importance of taking medications as discussed and  ensuring follow up even when doing well to prevent flares in the future. He understands and agrees.  --Continue Rowasa enemas nightly, can consider tapering it down if improving with Lialda   --Start Lialda 4.8g daily every morning  --We will obtain CRP/CBC/ESR  --Fecal Calprotectin after 8 weeks of starting Lialda   --If no improvement or flares up again we might consider Entyvio and Uceris   --Dexa scan   --F/U in 3 months with Ari De       IBD Health Care Maintenance:    Vaccinations:  -- Influenza (every year): Recommend yearly  -- TdaP (every 10 years): Last given 2009  -- Pneumococcal Pneumonia (once then every 5 years): Has not received     One time confirmation of immunity or serologies:  -- Hepatitis A (serologies or immunizations): 2009  -- Hepatitis B (serologies or immunizations): 1997, serologies show immunity  -- Zoster vaccination (>61 yo, discuss if over 50): has not received  -- MMR:2002  -- HPV (all aged 18-26): has not received  -- Meningococcal meningitis (all patients at risk for meningitis): 2009      Bone mineral density screening   -- Recommend all patients supplement with calcium and vitamin D  -- Given prior steroid use recommend DEXA if not already done     Cancer Screening:  Colon cancer screening:  Colonoscopy done 1/23/19.         Skin cancer screening: Since patient is not immunocompromised, this is per patient's dermatologist recommendations.        Misc:  -- Avoid tobacco use  -- Avoid NSAIDs as there is potentially a 25% chance of causing an IBD flare     RTC 3 months with Ari De for follow up.     Thank you for this consultation.  It was a pleasure to participate in the care of this patient; please contact us with any further questions.     Kamari Velasquez M.D  GI Fellow     HPI:   Fabian is a 23 y.o male with likely diagnosis of Crohn's Disease (possible duodenal, ileal and colon) however we do not have original endoscopy reports. He is currently on Penatsa and Rowasa  enemas. Patient recent colonoscopy showed mild to minimal chronic colitis with and endoscopic score of 9 mainly in the left sided colon. Patient currently reports 2-4 BM non-bloody and denies any other extraintestinal manifestations of IBD. His MRE also showed inflammation along the colon but normal small bowel. Patient use to follow with Dr. Whipple. He reports he just finished a prednisone taper and is currently feeling well overall. Patient denies any other joint pain, jaundice, pruritus, rash or abdominal pain. He also denies fevers or chills.     ROS:    No fevers or chills  No weight loss  No blurry vision, double vision or change in vision  No sore throat  No lymphadenopathy  No headache, paraesthesias, or weakness in a limb  No shortness of breath or wheezing  No chest pain or pressure  No arthralgias or myalgias  No rashes or skin changes  No odynophagia or dysphagia  No BRBPR, hematochezia, melena  No dysuria, frequency or urgency  No hot/cold intolerance or polyria  No anxiety or depression    Extra intestinal manifestations of IBD:  No uveitis/episcleritis  No aphthous ulcers   No arthritis   No erythema nodosum/pyoderma gangrenosum.     PERTINENT PAST MEDICAL HISTORY:  Past Medical History:   Diagnosis Date     Crohn's disease (H)        PREVIOUS SURGERIES:  None    PREVIOUS ENDOSCOPY:  As above    ALLERGIES:   No Known Allergies    PERTINENT MEDICATIONS:    Current Outpatient Medications:      calcium carbonate (OS-HARLAN 500 MG [place]. [place]) 500 MG tablet, Take 2,000 mg by mouth 2 times daily, Disp: , Rfl:      Cholecalciferol (VITAMIN D) 2000 UNITS CAPS, Take 50,000 Units by mouth , Disp: , Rfl:      mesalamine (LIALDA) 1.2 g EC tablet, Take 4 tablets (4,800 mg) by mouth every morning, Disp: 180 tablet, Rfl: 3     mesalamine (ROWASA) 4 g enema, Place 1 enema (4 g) rectally At Bedtime, Disp: 30 Bottle, Rfl: 0     Nutritional Supplements (ENSURE COMPLETE SHAKE PO), Take  by mouth., Disp: , Rfl:     SOCIAL  "HISTORY:  Social History     Socioeconomic History     Marital status: Single     Spouse name: Not on file     Number of children: Not on file     Years of education: Not on file     Highest education level: Not on file   Social Needs     Financial resource strain: Not on file     Food insecurity - worry: Not on file     Food insecurity - inability: Not on file     Transportation needs - medical: Not on file     Transportation needs - non-medical: Not on file   Occupational History     Not on file   Tobacco Use     Smoking status: Never Smoker     Smokeless tobacco: Never Used   Substance and Sexual Activity     Alcohol use: No     Drug use: No     Sexual activity: Not on file   Other Topics Concern     Not on file   Social History Narrative     Not on file       FAMILY HISTORY:  No history of CRC or IBD in his family      PHYSICAL EXAMINATION:  Constitutional: aaox3, cooperative, pleasant, not dyspneic/diaphoretic, no acute distress  Vitals reviewed: /70 (BP Location: Left arm, Patient Position: Sitting, Cuff Size: Adult Regular)   Pulse 76   Temp 98.4  F (36.9  C) (Oral)   Resp 18   Ht 1.753 m (5' 9\")   Wt 78.4 kg (172 lb 12.8 oz)   SpO2 97%   BMI 25.52 kg/m    Wt:   Wt Readings from Last 2 Encounters:   02/04/19 78.4 kg (172 lb 12.8 oz)   01/23/19 77.1 kg (170 lb)      Eyes: Sclera anicteric/injected  Ears/nose/mouth/throat: Normal oropharynx without ulcers or exudate, mucus membranes moist, hearing intact  Neck: supple, thyroid normal size  CV: No edema  Respiratory: Unlabored breathing  Lymph: No axillary, submandibular, supraclavicular or inguinal lymphadenopathy  Abd:  Nondistended, +bs, no hepatosplenomegaly, nontender, no peritoneal signs  Skin: warm, perfused, no jaundice  Psych: Normal affect  MSK: Normal gait      PERTINENT STUDIES:  Most recent CBC:  Recent Labs   Lab Test 11/14/18  1431 09/05/18  1537   WBC 12.7* 14.0*   HGB 15.8 16.5   HCT 46.4 49.4   * 358     Most recent hepatic " panel:  Recent Labs   Lab Test 11/14/18  1431 09/05/18  1537   ALT 21 22   AST 18 14     Most recent creatinine:  Recent Labs   Lab Test 09/05/18  1537 07/12/17  1316   CR 1.17 1.10

## 2019-02-04 NOTE — NURSING NOTE
"Chief Complaint   Patient presents with     RECHECK     Pt here for follow up       Vitals:    02/04/19 1308   BP: 133/70   BP Location: Left arm   Patient Position: Sitting   Cuff Size: Adult Regular   Pulse: 76   Resp: 18   Temp: 98.4  F (36.9  C)   TempSrc: Oral   SpO2: 97%   Weight: 78.4 kg (172 lb 12.8 oz)   Height: 1.753 m (5' 9\")       Body mass index is 25.52 kg/m .      MOSES Mitchell, EMT                      "

## 2019-02-04 NOTE — PROGRESS NOTES
I performed a history and physical examination of the above patient and discussed the management with Dr. Velasquez on 2/4/2019. I reviewed the note and there are no changes to the past medical, family or social history.  A complete 10 point review of systems was obtained. Please see the HPI for pertinent positives and negatives. All other systems were reviewed and were found to be negative. I agree with the documented findings and plan of care as outlined with the following additions:    2-4 non bloody bowel movements per day.   On pentasa 4g per day and rowasa enemas daily.    Clinical remission, mild endoscopic disease.     Will change to mesalamine formulated to colon (Lialda or Asacol).  Repeat fecal nando in 8 weeks.     If flares, plan for uceris and induction with vedolizumab.        Jasvir Harmon MD  GI Attending  Pager: [phone]

## 2019-02-04 NOTE — LETTER
2/4/2019       RE: Ferdinand Anglin  [address] [place] [address]     Dear Colleague,    Thank you for referring your patient, Ferdinand Anglin, to the [facility] at [facility]. Please see a copy of my visit note below.    I performed a history and physical examination of the above patient and discussed the management with Dr. Velasquez on 2/4/2019. I reviewed the note and there are no changes to the past medical, family or social history.  A complete 10 point review of systems was obtained. Please see the HPI for pertinent positives and negatives. All other systems were reviewed and were found to be negative. I agree with the documented findings and plan of care as outlined with the following additions:    2-4 non bloody bowel movements per day.   On pentasa 4g per day and rowasa enemas daily.    Clinical remission, mild endoscopic disease.     Will change to mesalamine formulated to colon (Lialda or Asacol).  Repeat fecal nando in 8 weeks.     If flares, plan for uceris and induction with vedolizumab.      Jasvir Harmon MD  GI Attending  Pager: [phone]    IBD CLINIC VISIT    CC/REFERRING MD:  Referred Self    REASON FOR CONSULTATION: follow up IBD    IBD HISTORY  Age at diagnosis: age 8  Extent of disease: On initial endoscopies he had gastritis and some duodenitis with granulomas in duodenum, also has had ileitis and colitis. Exact extent of involvement in colon on diagnosis not available, although colonoscopy 2007 showed some inflammation in rectosigmoid. Follow up EGD showed no granulomas. 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 Colonoscopy 1/23/19 with active left sided inflammation with a CD score of 9. Biopsies with mild and  minimally active chronic colitis with no dysplasia. Recently finished a prednisone taper and patient doing fairly well.   Disease phenotype: inflammatory  Isi-anal disease: None  Current CD medications:   -Pentasa 2000 mg PO BID - started again in September  -Mattrosarioas - has used intermittently since September - now nightly   Prior IBD surgeries: None  Prior IBD Medications: He has been on Pentasa in the past, anywhere from 500 b.i.d. to 2000 mg p.o. b.i.d.  He has been on-and-off prednisone over the years.  He has tried Entocort with no improvement in his symptoms.  He had been on Humira in the past with some question of improvement; however, he and his family all deny that the Humira helped him at all. That was back in 2013.     DRUG MONITORING  TPMT enzyme activity: 22.7, normal 9/16/16     6-TGN/6-MMPN levels: -- N/A     Biologic concentration:-- N/A    DISEASE ASSESSMENT  Labs  Recent Labs   Lab Test 11/14/18  1431 09/05/18  1537   CRP 40.0* 23.0*   SED 38* 12     Endoscopic assessment:            - Simple Endoscopic Score for Crohn's Disease: 9,                        mucosal inflammatory changes secondary to Crohn's                        disease with colonic involvement.                        - Continuous pseudopolyps from 50 to 70cm. Active                        disease mostly left sided (ulceration only noted in                        rectum).                        - One 3 mm polyp at 70 cm proximal to the anus, removed                        with a cold snare. Resected and retrieved.   Enterography: 2/5/18 MRE   IMPRESSION:   1. Active inflammation of the hepatic flexure, proximal to mid  transverse, descending, and sigmoid colon and upper rectum.  2. No active small bowel inflammation.    Fecal calprotectin: --  1589 11/30/18  C diff: -- NEGATIVE 11/30/18    ASSESSMENT/PLAN:  Fabian is a 23 y.o male with likely diagnosis of Crohn's Disease (possible duodenal, ileal and colon) however we do not have  original endoscopy reports. He is currently on Penatsa and Rowasa enemas. Patient recent colonoscopy showed mild to minimal chronic colitis with and endoscopic score of 9 mainly in the left sided colon. Patient currently reports 2-4 BM non-bloody and denies any other extraintestinal manifestations of IBD. His MRE also showed inflammation along the colon but normal small bowel. At this point we suspect he is in clinical remission with mild endoscopic disease on his current medication although we discussed the option of switching to Lialda given this medication has more effect in the colon rather than Pentasa mechanism of action is mainly in the small bowel. Patient also understood that if he flares up again he will likely required the need of Entyvio (biologic) and this will be a more long term medication option rather than prednisone. We will also repeat inflammatory markers today to have a better assessment of his baseline while not on a flare. After 8 weeks of lialda we also discussed the need of a fecal calprotectin to evaluate response. Patient agreed with the plan.  We discussed the importance of taking medications as discussed and ensuring follow up even when doing well to prevent flares in the future. He understands and agrees.  --Continue Rowasa enemas nightly, can consider tapering it down if improving with Lialda   --Start Lialda 4.8g daily every morning  --We will obtain CRP/CBC/ESR  --Fecal Calprotectin after 8 weeks of starting Lialda   --If no improvement or flares up again we might consider Entyvio and Uceris   --Dexa scan   --F/U in 3 months with Ari De       IBD Health Care Maintenance:    Vaccinations:  -- Influenza (every year): Recommend yearly  -- TdaP (every 10 years): Last given 2009  -- Pneumococcal Pneumonia (once then every 5 years): Has not received     One time confirmation of immunity or serologies:  -- Hepatitis A (serologies or immunizations): 2009  -- Hepatitis B (serologies or  immunizations): 1997, serologies show immunity  -- Zoster vaccination (>59 yo, discuss if over 50): has not received  -- MMR:2002  -- HPV (all aged 18-26): has not received  -- Meningococcal meningitis (all patients at risk for meningitis): 2009      Bone mineral density screening   -- Recommend all patients supplement with calcium and vitamin D  -- Given prior steroid use recommend DEXA if not already done     Cancer Screening:  Colon cancer screening:  Colonoscopy done 1/23/19.         Skin cancer screening: Since patient is not immunocompromised, this is per patient's dermatologist recommendations.        Misc:  -- Avoid tobacco use  -- Avoid NSAIDs as there is potentially a 25% chance of causing an IBD flare     RTC 3 months with Ari De for follow up.     Thank you for this consultation.  It was a pleasure to participate in the care of this patient; please contact us with any further questions.     Kamari Velasquez M.D  GI Fellow     HPI:   Fabian is a 23 y.o male with likely diagnosis of Crohn's Disease (possible duodenal, ileal and colon) however we do not have original endoscopy reports. He is currently on Penatsa and Rowasa enemas. Patient recent colonoscopy showed mild to minimal chronic colitis with and endoscopic score of 9 mainly in the left sided colon. Patient currently reports 2-4 BM non-bloody and denies any other extraintestinal manifestations of IBD. His MRE also showed inflammation along the colon but normal small bowel. Patient use to follow with Dr. Whipple. He reports he just finished a prednisone taper and is currently feeling well overall. Patient denies any other joint pain, jaundice, pruritus, rash or abdominal pain. He also denies fevers or chills.     ROS:    No fevers or chills  No weight loss  No blurry vision, double vision or change in vision  No sore throat  No lymphadenopathy  No headache, paraesthesias, or weakness in a limb  No shortness of breath or wheezing  No chest pain  or pressure  No arthralgias or myalgias  No rashes or skin changes  No odynophagia or dysphagia  No BRBPR, hematochezia, melena  No dysuria, frequency or urgency  No hot/cold intolerance or polyria  No anxiety or depression    Extra intestinal manifestations of IBD:  No uveitis/episcleritis  No aphthous ulcers   No arthritis   No erythema nodosum/pyoderma gangrenosum.     PERTINENT PAST MEDICAL HISTORY:  Past Medical History:   Diagnosis Date     Crohn's disease (H)        PREVIOUS SURGERIES:  None    PREVIOUS ENDOSCOPY:  As above    ALLERGIES:   No Known Allergies    PERTINENT MEDICATIONS:    Current Outpatient Medications:      calcium carbonate (OS-HARLAN 500 MG [place]. [place]) 500 MG tablet, Take 2,000 mg by mouth 2 times daily, Disp: , Rfl:      Cholecalciferol (VITAMIN D) 2000 UNITS CAPS, Take 50,000 Units by mouth , Disp: , Rfl:      mesalamine (LIALDA) 1.2 g EC tablet, Take 4 tablets (4,800 mg) by mouth every morning, Disp: 180 tablet, Rfl: 3     mesalamine (ROWASA) 4 g enema, Place 1 enema (4 g) rectally At Bedtime, Disp: 30 Bottle, Rfl: 0     Nutritional Supplements (ENSURE COMPLETE SHAKE PO), Take  by mouth., Disp: , Rfl:     SOCIAL HISTORY:  Social History     Socioeconomic History     Marital status: Single     Spouse name: Not on file     Number of children: Not on file     Years of education: Not on file     Highest education level: Not on file   Social Needs     Financial resource strain: Not on file     Food insecurity - worry: Not on file     Food insecurity - inability: Not on file     Transportation needs - medical: Not on file     Transportation needs - non-medical: Not on file   Occupational History     Not on file   Tobacco Use     Smoking status: Never Smoker     Smokeless tobacco: Never Used   Substance and Sexual Activity     Alcohol use: No     Drug use: No     Sexual activity: Not on file   Other Topics Concern     Not on file   Social History Narrative     Not on file       FAMILY HISTORY:  No  "history of CRC or IBD in his family      PHYSICAL EXAMINATION:  Constitutional: aaox3, cooperative, pleasant, not dyspneic/diaphoretic, no acute distress  Vitals reviewed: /70 (BP Location: Left arm, Patient Position: Sitting, Cuff Size: Adult Regular)   Pulse 76   Temp 98.4  F (36.9  C) (Oral)   Resp 18   Ht 1.753 m (5' 9\")   Wt 78.4 kg (172 lb 12.8 oz)   SpO2 97%   BMI 25.52 kg/m     Wt:   Wt Readings from Last 2 Encounters:   02/04/19 78.4 kg (172 lb 12.8 oz)   01/23/19 77.1 kg (170 lb)      Eyes: Sclera anicteric/injected  Ears/nose/mouth/throat: Normal oropharynx without ulcers or exudate, mucus membranes moist, hearing intact  Neck: supple, thyroid normal size  CV: No edema  Respiratory: Unlabored breathing  Lymph: No axillary, submandibular, supraclavicular or inguinal lymphadenopathy  Abd:  Nondistended, +bs, no hepatosplenomegaly, nontender, no peritoneal signs  Skin: warm, perfused, no jaundice  Psych: Normal affect  MSK: Normal gait      PERTINENT STUDIES:  Most recent CBC:  Recent Labs   Lab Test 11/14/18  1431 09/05/18  1537   WBC 12.7* 14.0*   HGB 15.8 16.5   HCT 46.4 49.4   * 358     Most recent hepatic panel:  Recent Labs   Lab Test 11/14/18  1431 09/05/18  1537   ALT 21 22   AST 18 14     Most recent creatinine:  Recent Labs   Lab Test 09/05/18  1537 07/12/17  1316   CR 1.17 1.10       Again, thank you for allowing me to participate in the care of your patient.      Sincerely,    Jasvir Harmon MD      "

## 2019-02-04 NOTE — PATIENT INSTRUCTIONS
It was a pleasure taking care of you today.  I've included a brief summary of our discussion and care plan from today's visit below.  Please review this information with your primary care provider.  _______________________________________________________________________    My recommendations are summarized as follows:    --  Blood work today    --  Stop Pentasa    --  Start Lialda (or equivalent based on pharmacy coverage)     --  Continue Rowasa enemas, but try to wean off over the next month    -- Fecal calprotectin in 8 weeks    --  DEXA scan    -- Review information on Entyvio.     Return to GI Clinic in 3 months to review your progress.    _______________________________________________________________________    Who do I call with any questions after my visit?  Please be in touch if there are any further questions that arise following today's visit.  There are multiple ways to contact your gastroenterology care team.        During business hours, you may reach a Gastroenterology nurse at [phone].      To schedule or reschedule an appointment, please call [phone].       You can always send a secure message through Sape.  Sape messages are answered by your nurse or doctor typically within 24 hours.  Please allow extra time on weekends and holidays.        For urgent/emergent questions after business hours, you may reach the on-call GI Fellow by contacting the [facility] at [phone].     How will I get the results of any tests ordered?    You will receive all of your results.  If you have signed up for Sape, any tests ordered at your visit will be available to you after your physician reviews them.  Typically this takes 1-2 weeks.  If there are urgent results that require a change in your care plan, your physician or nurse will call you to discuss the next steps.      What is Sape?  Sape is a secure way for you to access all of your healthcare records from the  [facility].  It is a web based computer program, so you can sign on to it from any location.  It also allows you to send secure messages to your care team.  I recommend signing up for [employer] access if you have not already done so and are comfortable with using a computer.      How to I schedule a follow-up visit?  If you did not schedule a follow-up visit today, please call [phone] to schedule a follow-up office visit.        Sincerely,    Jasvir Harmon MD    [facility]  Division of Gastroenterology    \

## 2019-02-06 DIAGNOSIS — K50.10 CROHN'S DISEASE OF LARGE INTESTINE WITHOUT COMPLICATION (H): ICD-10-CM

## 2019-02-06 LAB
BASOPHILS # BLD AUTO: 0 10E9/L (ref 0–0.2)
BASOPHILS NFR BLD AUTO: 0.3 %
CRP SERPL-MCNC: <2.9 MG/L (ref 0–8)
DIFFERENTIAL METHOD BLD: NORMAL
EOSINOPHIL # BLD AUTO: 0.2 10E9/L (ref 0–0.7)
EOSINOPHIL NFR BLD AUTO: 2.2 %
ERYTHROCYTE [DISTWIDTH] IN BLOOD BY AUTOMATED COUNT: 12.8 % (ref 10–15)
ERYTHROCYTE [SEDIMENTATION RATE] IN BLOOD BY WESTERGREN METHOD: 9 MM/H (ref 0–15)
HCT VFR BLD AUTO: 48.6 % (ref 40–53)
HGB BLD-MCNC: 16.7 G/DL (ref 13.3–17.7)
LYMPHOCYTES # BLD AUTO: 3.3 10E9/L (ref 0.8–5.3)
LYMPHOCYTES NFR BLD AUTO: 43.8 %
MCH RBC QN AUTO: 31 PG (ref 26.5–33)
MCHC RBC AUTO-ENTMCNC: 34.4 G/DL (ref 31.5–36.5)
MCV RBC AUTO: 90 FL (ref 78–100)
MONOCYTES # BLD AUTO: 0.6 10E9/L (ref 0–1.3)
MONOCYTES NFR BLD AUTO: 8.1 %
NEUTROPHILS # BLD AUTO: 3.5 10E9/L (ref 1.6–8.3)
NEUTROPHILS NFR BLD AUTO: 45.6 %
PLATELET # BLD AUTO: 320 10E9/L (ref 150–450)
RBC # BLD AUTO: 5.39 10E12/L (ref 4.4–5.9)
WBC # BLD AUTO: 7.6 10E9/L (ref 4–11)

## 2019-02-06 PROCEDURE — 85025 COMPLETE CBC W/AUTO DIFF WBC: CPT | Performed by: INTERNAL MEDICINE

## 2019-02-06 PROCEDURE — 36415 COLL VENOUS BLD VENIPUNCTURE: CPT | Performed by: INTERNAL MEDICINE

## 2019-02-06 PROCEDURE — 85652 RBC SED RATE AUTOMATED: CPT | Performed by: INTERNAL MEDICINE

## 2019-02-06 PROCEDURE — 86140 C-REACTIVE PROTEIN: CPT | Performed by: INTERNAL MEDICINE

## 2019-02-10 ASSESSMENT — ANXIETY QUESTIONNAIRES
6. BECOMING EASILY ANNOYED OR IRRITABLE: SEVERAL DAYS
3. WORRYING TOO MUCH ABOUT DIFFERENT THINGS: NOT AT ALL
2. NOT BEING ABLE TO STOP OR CONTROL WORRYING: NOT AT ALL
GAD7 TOTAL SCORE: 2
GAD7 TOTAL SCORE: 2
5. BEING SO RESTLESS THAT IT IS HARD TO SIT STILL: SEVERAL DAYS
7. FEELING AFRAID AS IF SOMETHING AWFUL MIGHT HAPPEN: NOT AT ALL
4. TROUBLE RELAXING: NOT AT ALL
1. FEELING NERVOUS, ANXIOUS, OR ON EDGE: NOT AT ALL
7. FEELING AFRAID AS IF SOMETHING AWFUL MIGHT HAPPEN: NOT AT ALL
GAD7 TOTAL SCORE: 2

## 2019-02-10 ASSESSMENT — PATIENT HEALTH QUESTIONNAIRE - PHQ9
SUM OF ALL RESPONSES TO PHQ QUESTIONS 1-9: 4
10. IF YOU CHECKED OFF ANY PROBLEMS, HOW DIFFICULT HAVE THESE PROBLEMS MADE IT FOR YOU TO DO YOUR WORK, TAKE CARE OF THINGS AT HOME, OR GET ALONG WITH OTHER PEOPLE: NOT DIFFICULT AT ALL
SUM OF ALL RESPONSES TO PHQ QUESTIONS 1-9: 4

## 2019-02-11 ENCOUNTER — OFFICE VISIT (OUTPATIENT)
Dept: GASTROENTEROLOGY | Facility: CLINIC | Age: 24
End: 2019-02-11
Payer: COMMERCIAL

## 2019-02-11 DIAGNOSIS — F32.A DEPRESSION, UNSPECIFIED DEPRESSION TYPE: Primary | ICD-10-CM

## 2019-02-11 DIAGNOSIS — F41.9 ANXIETY: ICD-10-CM

## 2019-02-11 ASSESSMENT — PATIENT HEALTH QUESTIONNAIRE - PHQ9: SUM OF ALL RESPONSES TO PHQ QUESTIONS 1-9: 4

## 2019-02-11 ASSESSMENT — ANXIETY QUESTIONNAIRES: GAD7 TOTAL SCORE: 2

## 2019-02-11 NOTE — LETTER
2/11/2019       RE: Ferdinand Anglin  [address] [place] [address]     Dear Colleague,    Thank you for referring your patient, Ferdinand Anglin, to the [facility] GASTROENTEROLOGY AND IBD CLINIC at [facility]. Please see a copy of my visit note below.      Health Psychology                  Clinic    Department of Medicine  Faith Miguel, PhD, LP [phone]                          Clinics and Surgery Center  [facility] Jed Iniguez, PhD, LP [phone]                  3rd Floor  [place] [address]   Montrell Mason, PhD, ABPP, LP [phone]     [address] [facility],   [address] [facility],  Peggy Pak,  PhD, LP [phone]            [place]  [address]  [place] [address] Ashley Singletary, PhD, LP [phone]     Confidential Summary of Standard Psychodiagnostic Evaluation*    Referral Source:  Estrada Whipple MD    Reason for Referral:  Depression and anxiety in context of Crohn's disease that negatively impacted ability to self-manage IBD    Sources of Information:  Information was obtained from a clinical interview with the patient, review of available medical records, and administration of psychological assessments.     History of Presenting Concerns:  Ferdinand Anglin is a 23 year old with history of Crohn's disease diagnosed at age 8 (per EMR). He was previously treated by Dr. Whipple, who recommended consultation to health psychology in November 2018 due to symptoms of depression and anxiety that were negatively impacting Mr. Anglin's ability to self manage IBD. Specifically, Dr. Whipple reported that Niurka Anglin's mental health symptoms negatively impacted his ability to follow treatment recommendations and impaired the team's ability to fully evaluate his disease and to care for him appropriately.  At that time, Mr. Anglin endorsed depressed mood and anhedonia. However, he reported that his symptoms of depression have improved.  "He reports he often has several weeks that are \"pretty good\" in regard to mood and sometimes other weeks can result in lower mood. He reported his health has been good and as a result his mood has also improved.  He endorsed anticipatory anxiety about sedation prior to endoscopic procedures.  He linked to this anxiety to having a memory of anesthesia at age 7 that resulted in high anxiety.  He also dislikes feeling drugged and was apprehensive of sedation needed for a colonoscopy.  However, after hearing that he could have a colonoscopy done without sedation he elected to do this.  He stated that this was not a problem for him to not have sedation and would choose to do so again in the future.  He acknowledged that he put off the colonoscopy because of this fear for sedation, but believes that he would be able to tolerate a colonoscopy  without sedation and feels like he can do this when recommended in the future in a timely manner.  He denied general anxiety outside of anticipatory anxiety about procedures that require anesthesia.  He reported that his IBD resulted in difficulty keeping on weight and resulted in him attending an online high school.  However he questions if this was the right choice for him as this resulted in social isolation in our lessons.  He stated that flares of IBD negatively impact him emotionally, but that he marjorie with flares through \"toughing it out\".  He stated that he is a private person and does not like telling his friends or others about his illness.  He fears that they will feel sorry for him and not understand his health issues.  He states he has only 2 friends about living with Crohn's and that he often hides the things he needs to do to manage IBD from others.  He also reported that he is a \"prideful person\" and has rejected receiving academic accommodations in the past.    Medical History:    Past Medical History:   Diagnosis Date     Crohn's disease (H)        Past Surgical " History:   Procedure Laterality Date     COLONOSCOPY N/A 1/23/2019    Procedure: COMBINED COLONOSCOPY, SINGLE OR MULTIPLE BIOPSY/POLYPECTOMY BY BIOPSY;  Surgeon: Jasvir Harmon MD;  Location: [facility] OR       Current Outpatient Medications   Medication     calcium carbonate (OS-HARLAN 500 MG [place]. CA) 500 MG tablet     Cholecalciferol (VITAMIN D) 2000 UNITS CAPS     mesalamine (LIALDA) 1.2 g EC tablet     mesalamine (ROWASA) 4 g enema     Nutritional Supplements (ENSURE COMPLETE SHAKE PO)     No current facility-administered medications for this visit.        He endorsed a history of low medication adherence.  He stated that he is currently taking Lialda daily but acknowledges that it is hard to maintain daily medication for a long time.  He endorses once he starts to feel better, he begins to take IBD medications 2-3 times per week.  He stated he is currently taking Lialda daily and would like to keep this up.     Psychiatric history/substance use history:  Mr. Anglin reported a history of fluctuating depressive symptoms over the course of his lifetime but no diagnosis of a depressive disorder. Psychiatric history is significant for a ED visit for severe anxiety following marijuana use at age 18 (see [facility] note on 7/16/13), which did not result in inpatient admission, CD treatment or outpatient therapy thereafter. Prior to this DEC visit, he endorsed marijuana use 4 times and no other use of recreational drugs.  He reported that this resulted in feelings of derealization and depersonalization for about 1 year afterwards.  He participated in psychotherapy to manage the symptoms but stated that it was not helpful.  He reported that he manage this through not paying attention to the sensations and they are largely now resolved.  He stated he has no history of use of psychotropic medications and that he would not use such an intervention in the future if needed due to his preference.  He stated family  psychiatric history is significant for anxiety and his father.  He reported he has no history of suicidal ideation, suicide attempt, or psychiatric hospitalization.  He stated that he has not used alcohol for 6 months, and that he chose to quit alcohol use to improve his health.  Prior to this he endorsed infrequent binge drinking with friends.    Social History:  Mr. Anglin reported that he grew up in [place], [place] with his mother, father, and one younger sister.  He described his relationship with his parents overall is good.  He currently lives at home and is awaiting to start a job in [place] in June 2019.  He graduated from the [employer] in Dec 2018, majoring in computer science. 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 He is excited to make this move.  He stated that he does not have a history of abuse, neglect, or trauma.  To fill his time prior to starting work, he has joined a gym and is trying to exercise or practice "[employer] daily.  He also attends a coding meeting for software development twice per week.  He is single and has no children.    Psychological Assessment:  The patient completed the following battery of assessments during this psychological evaluation: World Health Organization Disability Assessment Schedule 2.0 12-item (WHODAS), Patient Health Questionnaire-9 (PHQ-9), Generalized Anxiety Disorder-7 screener (BRENT-7), and the CAGE Questionnaire Adapted to Include Drugs (CAGE-AID).    The WHODAS measures disability and functional impairment due to health conditions including diseases, illnesses, injuries, mental or emotional problems, and problems with alcohol or drugs. The possible range of scores is 12-60 and higher scores indicate higher levels of disability.   WHODAS 2.0 Total Score 2/11/2019   Total Score 13   Total Score MyChart 13       The PHQ-9 is an instrument for screening, diagnosing, monitoring and measuring the severity of  depression. Scores of 5, 10, 15, and 20 represent cutpoints for mild, moderate, moderately severe and severe depression, respectively.   PHQ-9 SCORE 2/10/2019   PHQ-9 Total Score MyChart 4 (Minimal depression)   PHQ-9 Total Score 4       The BRENT-7 is an instrument for screening, diagnosing, monitoring and measuring the severity of anxiety. Scores of 5, 10, and 15 represent cutpoints for mild, moderate, and severe anxiety, respectively.  BRENT-7 SCORE 2/10/2019   Total Score 2 (minimal anxiety)   Total Score 2       The CAGE-AID questionnaire is used to screen for alcohol or drug abuse and dependence in adults. A CAGE-AID score  > 1 is a positive screen, suggesting further discussion is needed to determine if evaluation for alcohol or substance abuse is appropriate. A score > 2 is considered clinically significant, suggesting further evaluation of alcohol or substance-related problems is indicated.  CAGE-AID Total Score 2/10/2019   Total Score 0   Total Score MyChart 0 (A total score of 2 or greater is considered clinically significant)     Mental Status Examination:  Appearance/Behavior/Orientation: Patient was on time, appropriately groomed and dressed, and demonstrated good eye contact. Alert and oriented to person, place, time, and situation. No evidence of psychomotor agitation.     Cooperation/Reliability: Patient was open and cooperative throughout the session.    Speech/Language: Speech was clear, coherent, and of normal rate, rhythm and volume.   Thought Form: Overall logical and organized.   Thought Content: Appropriate to interview and situation.  Cognition/Memory: Not formally assessed, but no difficulties apparent upon interview.   Attention/Concentration: Good throughout interview.    Fund of knowledge: Consistent with age and level of education.    Abstract reasoning: Not assessed.   Judgment: Intact.    Mood/Affect: Mood euthymic; appropriate range of affect.    Insight/Motivation: Good,  fair  Suicide/Assault: Patient denies suicidal ideation, plan, or intent.    Impression:  Ferdinand Anglin is a 23 year old male with history of Crohn's disease diagnosed at age 8 who presents with minimal current symptoms of depression and anxiety but endorses a history of anxiety and depression that have negatively impacted his ability to self manage illness.  He endorses anxiety about sedation has prevented timely follow-up with recommendation for endoscopic imaging, but he states that he can tolerate the colonoscopy well without anesthesia and plans to keep on schedule for colonoscopy follow-ups in the future with having the knowledge he can have this procedure without sedation.  He also endorsed difficulty with medication adherence, but is currently taking IBD medications daily.  Overall, emotional and physical health appear relatively well managed but he may benefit from CBT for prophylactic development of skills and strategies to cope with the emotional impact of illness, improve medication adherence, and manage symptoms of depression.    Diagnosis:  Depression, unspecified  Anxiety, unspecified    Recommendation/Plan:  Provided recommendation for the patient to consider a brief course of cognitive behavioral therapy for development of strategies to cope with the emotional impact of illness, improve medication adherence, and manage symptoms of depression.  Provided psychoeducation about the emotional impact of IBD and behavioral strategies for setting specific, measurable, achievable, relevant, and time-bound goals to improve medication adherence.  A treatment plan will be completed in the first therapy session if he returns to treatment.  He was unsure at the end of the session if he wanted to follow-up with GI health psychology prior to moving to [place] in June.    *In accordance with the Rules of the [place] Board of Psychology, it is noted that psychological descriptions and scientific procedures  underlying psychological evaluations have limitations.  Absolute predictions cannot be made based on information in this report.    Again, thank you for allowing me to participate in the care of your patient.      Sincerely,    Peggy Pak, PhD

## 2019-02-11 NOTE — PROGRESS NOTES
"  Health Psychology                  Clinic    Department of Medicine  Faith Miguel, PhD, LP [phone]                          Clinics [facility] Center  [facility] Jed Iniguez, PhD, LP [phone]                  3rd Floor  [place] Mail Code 741   Montrell Mason, PhD, ABPP, LP [phone] [address] [facility],   [address] [facility],  Peggy Pak,  PhD, LP [phone]            [place] [address] Ashley Singletary, PhD, LP [phone]     Confidential Summary of Standard Psychodiagnostic Evaluation*    Referral Source:  Estrada Whipple MD    Reason for Referral:  Depression and anxiety in context of Crohn's disease that negatively impacted ability to self-manage IBD    Sources of Information:  Information was obtained from a clinical interview with the patient, review of available medical records, and administration of psychological assessments.     History of Presenting Concerns:  Ferdinnad Anglin is a 23 year old with history of Crohn's disease diagnosed at age 8 (per EMR). He was previously treated by Dr. Whipple, who recommended consultation to health psychology in November 2018 due to symptoms of depression and anxiety that were negatively impacting Mr. Anglin's ability to self manage IBD. Specifically, Dr. Whipple reported that Niurka Medeross mental health symptoms negatively impacted his ability to follow treatment recommendations and impaired the team's ability to fully evaluate his disease and to care for him appropriately.  At that time, Mr. Anglin endorsed depressed mood and anhedonia. However, he reported that his symptoms of depression have improved. He reports he often has several weeks that are \"pretty good\" in regard to mood and sometimes other weeks can result in lower mood. He reported his health has been good and as a result his mood has also improved.  He endorsed anticipatory anxiety about sedation prior to endoscopic procedures.  He linked " "to this anxiety to having a memory of anesthesia at age 7 that resulted in high anxiety.  He also dislikes feeling drugged and was apprehensive of sedation needed for a colonoscopy.  However, after hearing that he could have a colonoscopy done without sedation he elected to do this.  He stated that this was not a problem for him to not have sedation and would choose to do so again in the future.  He acknowledged that he put off the colonoscopy because of this fear for sedation, but believes that he would be able to tolerate a colonoscopy  without sedation and feels like he can do this when recommended in the future in a timely manner.  He denied general anxiety outside of anticipatory anxiety about procedures that require anesthesia.  He reported that his IBD resulted in difficulty keeping on weight and resulted in him attending an online high school.  However he questions if this was the right choice for him as this resulted in social isolation in our lessons.  He stated that flares of IBD negatively impact him emotionally, but that he marjorie with flares through \"toughing it out\".  He stated that he is a private person and does not like telling his friends or others about his illness.  He fears that they will feel sorry for him and not understand his health issues.  He states he has only 2 friends about living with Crohn's and that he often hides the things he needs to do to manage IBD from others.  He also reported that he is a \"prideful person\" and has rejected receiving academic accommodations in the past.    Medical History:    Past Medical History:   Diagnosis Date     Crohn's disease (H)        Past Surgical History:   Procedure Laterality Date     COLONOSCOPY N/A 1/23/2019    Procedure: COMBINED COLONOSCOPY, SINGLE OR MULTIPLE BIOPSY/POLYPECTOMY BY BIOPSY;  Surgeon: Jasvir Harmon MD;  Location: [facility] OR       Current Outpatient Medications   Medication     calcium carbonate (OS-HARLAN 500 MG [place]. CA) 500 " MG tablet     Cholecalciferol (VITAMIN D) 2000 UNITS CAPS     mesalamine (LIALDA) 1.2 g EC tablet     mesalamine (ROWASA) 4 g enema     Nutritional Supplements (ENSURE COMPLETE SHAKE PO)     No current facility-administered medications for this visit.        He endorsed a history of low medication adherence.  He stated that he is currently taking Lialda daily but acknowledges that it is hard to maintain daily medication for a long time.  He endorses once he starts to feel better, he begins to take IBD medications 2-3 times per week.  He stated he is currently taking Lialda daily and would like to keep this up.     Psychiatric history/substance use history:  Mr. Anglin reported a history of fluctuating depressive symptoms over the course of his lifetime but no diagnosis of a depressive disorder. Psychiatric history is significant for a ED visit for severe anxiety following marijuana use at age 18 (see [facility] note on 7/16/13), which did not result in inpatient admission, CD treatment or outpatient therapy thereafter. Prior to this DEC visit, he endorsed marijuana use 4 times and no other use of recreational drugs.  He reported that this resulted in feelings of derealization and depersonalization for about 1 year afterwards.  He participated in psychotherapy to manage the symptoms but stated that it was not helpful.  He reported that he manage this through not paying attention to the sensations and they are largely now resolved.  He stated he has no history of use of psychotropic medications and that he would not use such an intervention in the future if needed due to his preference.  He stated family psychiatric history is significant for anxiety and his father.  He reported he has no history of suicidal ideation, suicide attempt, or psychiatric hospitalization.  He stated that he has not used alcohol for 6 months, and that he chose to quit alcohol use to improve his health.  Prior to this he endorsed  infrequent binge drinking with friends.    Social History:  Mr. Anglin reported that he grew up in [place], [place] with his mother, father, and one younger sister.  He described his relationship with his parents overall is good.  He currently lives at home and is awaiting to start a job in [place] in June 2019.  He graduated from the [employer] in Dec 2018, majoring in computer science. 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 He is excited to make this move.  He stated that he does not have a history of abuse, neglect, or trauma.  To fill his time prior to starting work, he has joined a gym and is trying to exercise or practice Modlar daily.  He also attends a coding meeting for software development twice per week.  He is single and has no children.    Psychological Assessment:  The patient completed the following battery of assessments during this psychological evaluation: World Health Organization Disability Assessment Schedule 2.0 12-item (WHODAS), Patient Health Questionnaire-9 (PHQ-9), Generalized Anxiety Disorder-7 screener (BRENT-7), and the CAGE Questionnaire Adapted to Include Drugs (CAGE-AID).    The WHODAS measures disability and functional impairment due to health conditions including diseases, illnesses, injuries, mental or emotional problems, and problems with alcohol or drugs. The possible range of scores is 12-60 and higher scores indicate higher levels of disability.   WHODAS 2.0 Total Score 2/11/2019   Total Score 13   Total Score MyChart 13       The PHQ-9 is an instrument for screening, diagnosing, monitoring and measuring the severity of depression. Scores of 5, 10, 15, and 20 represent cutpoints for mild, moderate, moderately severe and severe depression, respectively.   PHQ-9 SCORE 2/10/2019   PHQ-9 Total Score MyChart 4 (Minimal depression)   PHQ-9 Total Score 4       The BRENT-7 is an instrument for screening, diagnosing, monitoring and  measuring the severity of anxiety. Scores of 5, 10, and 15 represent cutpoints for mild, moderate, and severe anxiety, respectively.  BRENT-7 SCORE 2/10/2019   Total Score 2 (minimal anxiety)   Total Score 2       The CAGE-AID questionnaire is used to screen for alcohol or drug abuse and dependence in adults. A CAGE-AID score  > 1 is a positive screen, suggesting further discussion is needed to determine if evaluation for alcohol or substance abuse is appropriate. A score > 2 is considered clinically significant, suggesting further evaluation of alcohol or substance-related problems is indicated.  CAGE-AID Total Score 2/10/2019   Total Score 0   Total Score MyChart 0 (A total score of 2 or greater is considered clinically significant)     Mental Status Examination:  Appearance/Behavior/Orientation: Patient was on time, appropriately groomed and dressed, and demonstrated good eye contact. Alert and oriented to person, place, time, and situation. No evidence of psychomotor agitation.     Cooperation/Reliability: Patient was open and cooperative throughout the session.    Speech/Language: Speech was clear, coherent, and of normal rate, rhythm and volume.   Thought Form: Overall logical and organized.   Thought Content: Appropriate to interview and situation.  Cognition/Memory: Not formally assessed, but no difficulties apparent upon interview.   Attention/Concentration: Good throughout interview.    Fund of knowledge: Consistent with age and level of education.    Abstract reasoning: Not assessed.   Judgment: Intact.    Mood/Affect: Mood euthymic; appropriate range of affect.    Insight/Motivation: Good, fair  Suicide/Assault: Patient denies suicidal ideation, plan, or intent.    Impression:  Ferdinand Anglin is a 23 year old male with history of Crohn's disease diagnosed at age 8 who presents with minimal current symptoms of depression and anxiety but endorses a history of anxiety and depression that have negatively  impacted his ability to self manage illness.  He endorses anxiety about sedation has prevented timely follow-up with recommendation for endoscopic imaging, but he states that he can tolerate the colonoscopy well without anesthesia and plans to keep on schedule for colonoscopy follow-ups in the future with having the knowledge he can have this procedure without sedation.  He also endorsed difficulty with medication adherence, but is currently taking IBD medications daily.  Overall, emotional and physical health appear relatively well managed but he may benefit from CBT for prophylactic development of skills and strategies to cope with the emotional impact of illness, improve medication adherence, and manage symptoms of depression.    Diagnosis:  Depression, unspecified  Anxiety, unspecified    Recommendation/Plan:  Provided recommendation for the patient to consider a brief course of cognitive behavioral therapy for development of strategies to cope with the emotional impact of illness, improve medication adherence, and manage symptoms of depression.  Provided psychoeducation about the emotional impact of IBD and behavioral strategies for setting specific, measurable, achievable, relevant, and time-bound goals to improve medication adherence.  A treatment plan will be completed in the first therapy session if he returns to treatment.  He was unsure at the end of the session if he wanted to follow-up with GI health psychology prior to moving to [place] in June.    Peggy Pak, PhD,   Clinical Health Psychologist    *In accordance with the Rules of the [place] Board of [employer], it is noted that psychological descriptions and scientific procedures underlying psychological evaluations have limitations.  Absolute predictions cannot be made based on information in this report.

## 2019-02-25 ENCOUNTER — TELEPHONE (OUTPATIENT)
Dept: GASTROENTEROLOGY | Facility: CLINIC | Age: 24
End: 2019-02-25

## 2019-02-25 ENCOUNTER — DOCUMENTATION ONLY (OUTPATIENT)
Dept: GASTROENTEROLOGY | Facility: CLINIC | Age: 24
End: 2019-02-25

## 2019-02-25 NOTE — PROGRESS NOTES
Received incoming fax from melisa's Mother-Vesta for Antoninaemma from [employer], completed and faxed back to her at [phone]; has been scanned into chart.

## 2019-02-25 NOTE — TELEPHONE ENCOUNTER
[facility] Call Center    Phone Message    May a detailed message be left on voicemail: yes    Reason for Call: Other: pt father called to check on status of forms for medication assisstance. pt father called to get a status update and request a call.  pt wants a call as soon as possible and says he has sent the forms multiple times.     Action Taken: Message routed to:  [facility] ([facility]): FUAD

## 2019-03-12 NOTE — PROGRESS NOTES
[facility] Call Center    Phone Message    May a detailed message be left on voicemail: yes    Reason for Call: Medication Question or concern regarding medication   Prescription Clarification  Name of Medication: mesalamine (LIALDA) 1.2 g EC tablet  Prescribing Provider: Faustino MATA    What on the order needs clarification? Lindsay from [facility] calling asking for clarification on  Pt's Rx (90 day supple, if possible), asking for administration and dosage. Please refax to [phone] questions please call [phone] Case #: [id number]     Action Taken: Message routed to:  [facility] ([facility]): GI

## 2019-03-12 NOTE — PROGRESS NOTES
Called  Jessica and gave telephone order for lialda for 4800 mg daily for 3 month supply with 3 refills.

## 2019-04-09 ENCOUNTER — OFFICE VISIT (OUTPATIENT)
Dept: GASTROENTEROLOGY | Facility: CLINIC | Age: 24
End: 2019-04-09
Payer: COMMERCIAL

## 2019-04-09 DIAGNOSIS — F32.A DEPRESSION, UNSPECIFIED DEPRESSION TYPE: Primary | ICD-10-CM

## 2019-04-09 DIAGNOSIS — F41.9 ANXIETY: ICD-10-CM

## 2019-04-09 NOTE — PROGRESS NOTES
Health Psychology                  Clinic    Department of Medicine  Faith Miguel, PhD, LP [phone]                          Clinics and Surgery Center  [facility] Jed Iniguez, PhD, LP [phone]                  3rd Floor  [place] Mail Code 746   Montrell Mason, PhD, ABPP, LP [phone]     [address] [facility], [address] Peggy Pak,  PhD, LP [phone]            [id number]  [place] [address] Ashley Singletary, PhD, LP [phone]    Health Psychology Follow-Up Note    SUBJECTIVE:  Ferdinand Anglin was seen for individual psychotherapy. Reviewed emotional and physical health since our last session. The patient reported no major changes since the last session in regards to health, mood, or anxiety.  He endorsed continued dissatisfaction with how he is spending his time, and stated that he is often engaged in time on screens for many hours a day and he finds this to be not meaningful time spent. A treatment plan was completed in collaboration with the patient in this session.  See medical record for treatment plan.    Provided a rationale for cognitive behavioral interventions and psychoeducation about the course and duration of treatment.  The patient agreed with recommendations. Oriented patient to structure of therapy sessions, including beginning with a brief review of mood, health, and relevant stressors since the last session; working to set an agenda in order to prioritize how we spend time in treatment; taking an active, approach focused on addressing agenda items in session; creating an action plan together in session to facilitate the patient's ability to practice coping strategies discussed in this session in order to help the patient reach their goals in treatment as quickly as possible; and checking in about the helpfulness of that session.      Provided psychoeducation about behavioral strategies to manage and prevent depressive  symptoms, specifically focusing on behavioral activation.  Discussed the depression cycle, which includes discussing how depression often can bring about changes in a person's life, daily routines, and their behavior. For example, amotivation or fatigue often results in a person cutting back activities, neglecting their daily tasks and responsibilities, or leaving decision-making to others.  Discussed how this results in increased guilt, hopelessness, and a sense of ineffectiveness, which then worsens and perpetuates the cycle of depression. Discussed how to use activity, especially activities that are pleasurable or result in a sense of purpose/accomplishment, to improve mood and reverse the depression cycle.  Discussed the advantages of increasing activity in a realistic and achievable way, including how activity may help improve mood, improve energy, and increase clarity of thinking.  Discussed a plan to turn off all screens after 2 AM and instead try meditation to relax and improve ability to fall asleep.    OBJECTIVE:  Appearance/Behavior/Orientation: Patient was on time, appropriately groomed and dressed, and demonstrated good eye contact. Alert and oriented to person, place, time, and situation. No evidence of psychomotor agitation.     Cooperation/Reliability: Patient was open and cooperative throughout the session.    Speech/Language: Speech was clear, coherent, and of normal rate, rhythm and volume.   Thought Form: Overall logical and organized.   Thought Content: Appropriate to interview and situation.  Cognition/Memory: Not formally assessed, but no difficulties apparent upon interview.   Attention/Concentration: Good throughout interview.    Fund of knowledge: Consistent with age and level of education.    Abstract reasoning: Not assessed.   Judgment: Intact.    Mood/Affect: Mood euthymic; appropriate range of affect.    Insight/Motivation: Good, fair  Suicide/Assault: Patient denies suicidal ideation,  plan, or intent.    ASSESSMENT:  Ferdinand Anglin is a 23 year old male with history of Crohn's disease diagnosed at age 8 who presents with minimal current symptoms of depression and anxiety but endorses a history of anxiety and depression that have negatively impacted his ability to self manage illness.  He endorses anxiety about sedation has prevented timely follow-up with recommendation for endoscopic imaging, but he states that he can tolerate the colonoscopy well without anesthesia and plans to keep on schedule for colonoscopy follow-ups in the future with having the knowledge he can have this procedure without sedation.  He also endorsed difficulty with medication adherence, but is currently taking IBD medications daily.  Overall, emotional and physical health appear relatively well managed but he may benefit from CBT for prophylactic development of skills and strategies to cope with the emotional impact of illness, improve medication adherence, and manage symptoms of depression.    DIAGNOSIS:  Depression, unspecified  Anxiety, unspecified    PLAN:  RTC for continued psychotherapy.     Time in: 2:18  Time out: 3:00  Extended session due to complexity of case and length of interval.    Peggy Pka, PhD,   Clinical Health Psychologist    [place] plan completed: 04/09/19  [place] plan due:  4/9/2020    *no letter

## 2019-04-09 NOTE — LETTER
4/9/2019       RE: Ferdinand Anglin  [address] [place] [place] [address]     Dear Colleague,    Thank you for referring your patient, Ferdinand Anglin, to the [facility] GASTROENTEROLOGY AND IBD CLINIC at [facility]. Please see a copy of my visit note below.      Health Psychology                  Clinic    Department of Medicine  Faith Miguel, PhD, LP [phone]                          Clinics and Surgery Center  [facility] Jed Iniguez, PhD, LP [phone]                  3rd Floor  [place] [address]   Montrell Mason, PhD, ABPP, LP [phone]     [address] [facility],   [address] [facility],  Peggy Pak,  PhD, LP [phone]            [place] [address]  [place] [address] Ashley Singletary, PhD, LP [phone]    Health Psychology Follow-Up Note    SUBJECTIVE:  Ferdinand Anglin was seen for individual psychotherapy. Reviewed emotional and physical health since our last session. The patient reported no major changes since the last session in regards to health, mood, or anxiety.  He endorsed continued dissatisfaction with how he is spending his time, and stated that he is often engaged in time on screens for many hours a day and he finds this to be not meaningful time spent. A treatment plan was completed in collaboration with the patient in this session.  See medical record for treatment plan.    Provided a rationale for cognitive behavioral interventions and psychoeducation about the course and duration of treatment.  The patient agreed with recommendations. Oriented patient to structure of therapy sessions, including beginning with a brief review of mood, health, and relevant stressors since the last session; working to set an agenda in order to prioritize how we spend time in treatment; taking an active, approach focused on addressing agenda items in session; creating an action plan together in session to facilitate the  patient's ability to practice coping strategies discussed in this session in order to help the patient reach their goals in treatment as quickly as possible; and checking in about the helpfulness of that session.      Provided psychoeducation about behavioral strategies to manage and prevent depressive symptoms, specifically focusing on behavioral activation.  Discussed the depression cycle, which includes discussing how depression often can bring about changes in a person's life, daily routines, and their behavior. For example, amotivation or fatigue often results in a person cutting back activities, neglecting their daily tasks and responsibilities, or leaving decision-making to others.  Discussed how this results in increased guilt, hopelessness, and a sense of ineffectiveness, which then worsens and perpetuates the cycle of depression. Discussed how to use activity, especially activities that are pleasurable or result in a sense of purpose/accomplishment, to improve mood and reverse the depression cycle.  Discussed the advantages of increasing activity in a realistic and achievable way, including how activity may help improve mood, improve energy, and increase clarity of thinking.  Discussed a plan to turn off all screens after 2 AM and instead try meditation to relax and improve ability to fall asleep.    OBJECTIVE:  Appearance/Behavior/Orientation: Patient was on time, appropriately groomed and dressed, and demonstrated good eye contact. Alert and oriented to person, place, time, and situation. No evidence of psychomotor agitation.     Cooperation/Reliability: Patient was open and cooperative throughout the session.    Speech/Language: Speech was clear, coherent, and of normal rate, rhythm and volume.   Thought Form: Overall logical and organized.   Thought Content: Appropriate to interview and situation.  Cognition/Memory: Not formally assessed, but no difficulties apparent upon interview.    Attention/Concentration: Good throughout interview.    Fund of knowledge: Consistent with age and level of education.    Abstract reasoning: Not assessed.   Judgment: Intact.    Mood/Affect: Mood euthymic; appropriate range of affect.    Insight/Motivation: Good, fair  Suicide/Assault: Patient denies suicidal ideation, plan, or intent.    ASSESSMENT:  Ferdinand Anglin is a 23 year old male with history of Crohn's disease diagnosed at age 8 who presents with minimal current symptoms of depression and anxiety but endorses a history of anxiety and depression that have negatively impacted his ability to self manage illness.  He endorses anxiety about sedation has prevented timely follow-up with recommendation for endoscopic imaging, but he states that he can tolerate the colonoscopy well without anesthesia and plans to keep on schedule for colonoscopy follow-ups in the future with having the knowledge he can have this procedure without sedation.  He also endorsed difficulty with medication adherence, but is currently taking IBD medications daily.  Overall, emotional and physical health appear relatively well managed but he may benefit from CBT for prophylactic development of skills and strategies to cope with the emotional impact of illness, improve medication adherence, and manage symptoms of depression.    DIAGNOSIS:  Depression, unspecified  Anxiety, unspecified    PLAN:  RTC for continued psychotherapy.     Time in: 2:18  Time out: 3:00  Extended session due to complexity of case and length of interval.      Tx plan completed: 04/09/19  Tx plan due:  4/9/2020    *no letter    Again, thank you for allowing me to participate in the care of your patient.      Sincerely,    Peggy Pak, PhD

## 2019-04-30 ENCOUNTER — TELEPHONE (OUTPATIENT)
Dept: GASTROENTEROLOGY | Facility: CLINIC | Age: 24
End: 2019-04-30

## 2019-05-01 ENCOUNTER — OFFICE VISIT (OUTPATIENT)
Dept: GASTROENTEROLOGY | Facility: CLINIC | Age: 24
End: 2019-05-01
Payer: COMMERCIAL

## 2019-05-01 VITALS
SYSTOLIC BLOOD PRESSURE: 136 MMHG | OXYGEN SATURATION: 96 % | WEIGHT: 178.2 LBS | BODY MASS INDEX: 26.39 KG/M2 | HEIGHT: 69 IN | HEART RATE: 70 BPM | DIASTOLIC BLOOD PRESSURE: 60 MMHG

## 2019-05-01 DIAGNOSIS — K50.80 CROHN'S DISEASE OF BOTH SMALL AND LARGE INTESTINE WITHOUT COMPLICATION (H): Primary | ICD-10-CM

## 2019-05-01 ASSESSMENT — MIFFLIN-ST. JEOR: SCORE: 1798.69

## 2019-05-01 ASSESSMENT — PAIN SCALES - GENERAL: PAINLEVEL: NO PAIN (0)

## 2019-05-01 NOTE — LETTER
5/1/2019       RE: Ferdinand Anglin  [address] [place] [address]     Dear Colleague,    Thank you for referring your patient, Ferdinand Anglin, to the [facility] GASTROENTEROLOGY AND IBD CLINIC at [facility]. Please see a copy of my visit note below.    IBD CLINIC VISIT    CC/REFERRING MD:  Referred Self    REASON FOR CONSULTATION: follow up IBD    IBD HISTORY  Age at diagnosis: age 8  Extent of disease: On initial endoscopies he had gastritis and some duodenitis with granulomas in duodenum, also has had ileitis and colitis. Exact extent of involvement in colon on diagnosis not available, although colonoscopy 2007 showed some inflammation in rectosigmoid. Follow up EGD showed no granulomas. 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 Colonoscopy 1/23/19 with active left sided inflammation with a CD score of 9. Biopsies with mild and minimally active chronic colitis with no dysplasia. Recently finished a prednisone taper and patient doing fairly well.   Disease phenotype: inflammatory  Isi-anal disease: None  Current CD medications:   Lialda 4.8g daily  Rowasa enemas (has now discontinued)  Prior IBD surgeries: None  Prior IBD Medications: He has been on Pentasa in the past, anywhere from 500 b.i.d. to 2000 mg p.o. b.i.d.  He has been on-and-off prednisone over the years.  He has tried Entocort with no improvement in his symptoms.  He had been on Humira in the past with some question of improvement; however, he and his family all deny that the Humira helped him at all. That was back in 2013. Most recently retried on Pentasa, but switched to better colonic coverage with Lialda. Has also been on cortenemas in the past.    DRUG MONITORING  TPMT enzyme activity: 22.7,  normal 9/16/16     6-TGN/6-MMPN levels: -- N/A     Biologic concentration:-- N/A    DISEASE ASSESSMENT  Labs  Recent Labs   Lab Test 02/06/19  1406 11/14/18  1431   CRP <2.9 40.0*   SED 9 38*     Endoscopic assessment: colonoscopy 1/2019           - Simple Endoscopic Score for Crohn's Disease: 9,                        mucosal inflammatory changes secondary to Crohn's                        disease with colonic involvement.                        - Continuous pseudopolyps from 50 to 70cm. Active                        disease mostly left sided (ulceration only noted in                        rectum).                        - One 3 mm polyp at 70 cm proximal to the anus, removed                        with a cold snare. Resected and retrieved.   Enterography: 2/5/18 MRE   IMPRESSION:   1. Active inflammation of the hepatic flexure, proximal to mid  transverse, descending, and sigmoid colon and upper rectum.  2. No active small bowel inflammation.    Fecal calprotectin: 1589 11/30/18  C diff: NEGATIVE 11/30/18    ASSESSMENT/PLAN:  Fabian is a 24 y.o male with likely diagnosis of Crohn's Disease (possible duodenal, ileal and colon) however we do not have original endoscopy reports.     1. Crohn's disease (possible duodenal, ileal and colonic):  Last endoscopic assessment 1/2019 showed mild to minimal chronic colitis with an endoscopic score of 9 mainly in the left sided colon. Symptomatically improved with lialda 4.8 g daily and has discontinued rectal therapies.  The endoscopic evaluation was considered to be in clinical remission with mild endoscopic disease on his current medication.  Patient also understood that if he flares up again he will likely required the need of Entyvio (biologic) and this will be a more long term medication option rather than prednisone. Since we have now switched to lialda from Pentasa, we will also obtain a fecal calprotectin to evaluate response. Patient is moving to [place] as of July 2019  ([place]) and will require transition in care.  We discussed the importance of taking medications as discussed and ensuring follow up even when doing well to prevent flares in the future. He understands and agrees.  --Continue Rowasa enemas nightly, can consider tapering it down if improving with Lialda   --Start Lialda 4.8g daily every morning  --We will obtain CRP/CBC/ESR  --Fecal Calprotectin after 8 weeks of starting Lialda   --If no improvement or flares up again we might consider Entyvio and Uceris   --Dexa scan   --F/U in 3 months with Ari De       IBD Health Care Maintenance:    Vaccinations:  -- Influenza (every year): Recommend yearly  -- TdaP (every 10 years): Last given 2009  -- Pneumococcal Pneumonia (once then every 5 years): Has not received     One time confirmation of immunity or serologies:  -- Hepatitis A (serologies or immunizations): 2009  -- Hepatitis B (serologies or immunizations): 1997, serologies show immunity  -- Zoster vaccination (>59 yo, discuss if over 50): has not received  -- MMR:2002  -- HPV (all aged 18-26): has not received  -- Meningococcal meningitis (all patients at risk for meningitis): 2009      Bone mineral density screening   -- Recommend all patients supplement with calcium and vitamin D  -- Given prior steroid use recommend DEXA if not already done     Cancer Screening:  Colon cancer screening:  Colonoscopy done 1/23/19.     Skin cancer screening: Since patient is not immunocompromised, this is per patient's dermatologist recommendations.     Misc:  -- Avoid tobacco use  -- Avoid NSAIDs as there is potentially a 25% chance of causing an IBD flare     RTC 6 months (in [place])     Thank you for this consultation.  It was a pleasure to participate in the care of this patient; please contact us with any further questions.       HPI:   Fabian is a 23 y.o male with likely diagnosis of Crohn's Disease (possible duodenal, ileal and colon) however we do not have original  endoscopy reports. He is currently on Lialda 4.8 g daily.  After colonoscopy 1/2019, Rowasa enemas were also added, however patient has now discontinued these. Colonoscopy 1/2019 showed mild to minimal chronic colitis with an endoscopic score of 9 mainly in the left sided colon. Prednisone taper was initiated and successfully completed after this time.  His MRE in 2018 also showed inflammation along the colon but normal small bowel.     Patient currently reports 3-4 BM non-bloody stools that are formed.  He denies any urgency or nighttime stools. No abdominal pain or any EIM. No fever or chills.     Patient will be moving to [facility] as of July 2019.    ROS:    No fevers or chills  No weight loss  No blurry vision, double vision or change in vision  No sore throat  No lymphadenopathy  No headache, paraesthesias, or weakness in a limb  No shortness of breath or wheezing  No chest pain or pressure  No arthralgias or myalgias  No rashes or skin changes  No odynophagia or dysphagia  No BRBPR, hematochezia, melena  No dysuria, frequency or urgency  No hot/cold intolerance or polyria  + anxiety or depression    Extra intestinal manifestations of IBD:  No uveitis/episcleritis  No aphthous ulcers   No arthritis   No erythema nodosum/pyoderma gangrenosum.     PERTINENT PAST MEDICAL HISTORY:  Past Medical History:   Diagnosis Date     Crohn's disease (H)        PREVIOUS SURGERIES:  None    PREVIOUS ENDOSCOPY:  As above    ALLERGIES:   No Known Allergies    PERTINENT MEDICATIONS:    Current Outpatient Medications:      calcium carbonate (OS-HARLAN 500 MG [place]. [place]) 500 MG tablet, Take 2,000 mg by mouth 2 times daily, Disp: , Rfl:      Cholecalciferol (VITAMIN D) 2000 UNITS CAPS, Take 50,000 Units by mouth , Disp: , Rfl:      mesalamine (LIALDA) 1.2 g EC tablet, Take 4 tablets (4,800 mg) by mouth every morning, Disp: 180 tablet, Rfl: 3     mesalamine (ROWASA) 4 g enema, Place 1 enema (4 g) rectally At Bedtime, Disp: 30 Bottle,  "Rfl: 0     Nutritional Supplements (ENSURE COMPLETE SHAKE PO), Take  by mouth., Disp: , Rfl:     SOCIAL HISTORY:  Social History     Socioeconomic History     Marital status: Single     Spouse name: Not on file     Number of children: Not on file     Years of education: Not on file     Highest education level: Not on file   Occupational History     Not on file   Social Needs     Financial resource strain: Not on file     Food insecurity:     Worry: Not on file     Inability: Not on file     Transportation needs:     Medical: Not on file     Non-medical: Not on file   Tobacco Use     Smoking status: Never Smoker     Smokeless tobacco: Never Used   Substance and Sexual Activity     Alcohol use: No     Drug use: No     Sexual activity: Not on file   Lifestyle     Physical activity:     Days per week: Not on file     Minutes per session: Not on file     Stress: Not on file   Relationships     Social connections:     Talks on phone: Not on file     Gets together: Not on file     Attends Yarsani service: Not on file     Active member of club or organization: Not on file     Attends meetings of clubs or organizations: Not on file     Relationship status: Not on file     Intimate partner violence:     Fear of current or ex partner: Not on file     Emotionally abused: Not on file     Physically abused: Not on file     Forced sexual activity: Not on file   Other Topics Concern     Not on file   Social History Narrative     Not on file       FAMILY HISTORY:  No history of CRC or IBD in his family      PHYSICAL EXAMINATION:  Constitutional: aaox3, cooperative, pleasant, not dyspneic/diaphoretic, no acute distress  Vitals reviewed: /60   Pulse 70   Ht 1.753 m (5' 9\")   Wt 80.8 kg (178 lb 3.2 oz)   SpO2 96%   BMI 26.32 kg/m     Wt:   Wt Readings from Last 2 Encounters:   02/04/19 78.4 kg (172 lb 12.8 oz)   01/23/19 77.1 kg (170 lb)      Eyes: Sclera anicteric/injected  Ears/nose/mouth/throat: Normal oropharynx " without ulcers or exudate, mucus membranes moist, hearing intact  Neck: supple, thyroid normal size  CV: No edema  Respiratory: Unlabored breathing  Lymph: No axillary, submandibular, supraclavicular or inguinal lymphadenopathy  Abd:  Nondistended, +bs, no hepatosplenomegaly, nontender, no peritoneal signs  Skin: warm, perfused, no jaundice  Psych: Normal affect  MSK: Normal gait      PERTINENT STUDIES:  Most recent CBC:  Recent Labs   Lab Test 02/06/19  1406 11/14/18  1431   WBC 7.6 12.7*   HGB 16.7 15.8   HCT 48.6 46.4    460*     Most recent hepatic panel:  Recent Labs   Lab Test 11/14/18  1431 09/05/18  1537   ALT 21 22   AST 18 14     Most recent creatinine:  Recent Labs   Lab Test 09/05/18  1537 07/12/17  1316   CR 1.17 1.10       Again, thank you for allowing me to participate in the care of your patient.      Sincerely,    Ari De PA-C

## 2019-05-01 NOTE — PROGRESS NOTES
IBD CLINIC VISIT    CC/REFERRING MD:  Referred Self    REASON FOR CONSULTATION: follow up IBD    IBD HISTORY  Age at diagnosis: age 8  Extent of disease: On initial endoscopies he had gastritis and some duodenitis with granulomas in duodenum, also has had ileitis and colitis. Exact extent of involvement in colon on diagnosis not available, although colonoscopy 2007 showed some inflammation in rectosigmoid. Follow up EGD showed no granulomas. 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 Colonoscopy 1/23/19 with active left sided inflammation with a CD score of 9. Biopsies with mild and minimally active chronic colitis with no dysplasia. Recently finished a prednisone taper and patient doing fairly well.   Disease phenotype: inflammatory  Isi-anal disease: None  Current CD medications:   Lialda 4.8g daily  Rowasa enemas (has now discontinued)  Prior IBD surgeries: None  Prior IBD Medications: He has been on Pentasa in the past, anywhere from 500 b.i.d. to 2000 mg p.o. b.i.d.  He has been on-and-off prednisone over the years.  He has tried Entocort with no improvement in his symptoms.  He had been on Humira in the past with some question of improvement; however, he and his family all deny that the Humira helped him at all. That was back in 2013. Most recently retried on Pentasa, but switched to better colonic coverage with Lialda. Has also been on cortenemas in the past.    DRUG MONITORING  TPMT enzyme activity: 22.7, normal 9/16/16     6-TGN/6-MMPN levels: -- N/A     Biologic concentration:-- N/A    DISEASE ASSESSMENT  Labs  Recent Labs   Lab Test 02/06/19  1406 11/14/18  1431   CRP <2.9 40.0*   SED 9 38*     Endoscopic assessment: colonoscopy 1/2019           - Simple Endoscopic Score for Crohn's Disease: 9,                         mucosal inflammatory changes secondary to Crohn's                        disease with colonic involvement.                        - Continuous pseudopolyps from 50 to 70cm. Active                        disease mostly left sided (ulceration only noted in                        rectum).                        - One 3 mm polyp at 70 cm proximal to the anus, removed                        with a cold snare. Resected and retrieved.   Enterography: 2/5/18 MRE   IMPRESSION:   1. Active inflammation of the hepatic flexure, proximal to mid  transverse, descending, and sigmoid colon and upper rectum.  2. No active small bowel inflammation.    Fecal calprotectin: 1589 11/30/18  C diff: NEGATIVE 11/30/18    ASSESSMENT/PLAN:  Fabian is a 24 y.o male with likely diagnosis of Crohn's Disease (possible duodenal, ileal and colon) however we do not have original endoscopy reports.     1. Crohn's disease (possible duodenal, ileal and colonic):  Last endoscopic assessment 1/2019 showed mild to minimal chronic colitis with an endoscopic score of 9 mainly in the left sided colon. Symptomatically improved with lialda 4.8 g daily and has discontinued rectal therapies.  The endoscopic evaluation was considered to be in clinical remission with mild endoscopic disease on his current medication.  Patient also understood that if he flares up again he will likely required the need of Entyvio (biologic) and this will be a more long term medication option rather than prednisone. Since we have now switched to lialda from Pentasa, we will also obtain a fecal calprotectin to evaluate response. Patient is moving to [place] as of July 2019 ([place]) and will require transition in care.  We discussed the importance of taking medications as discussed and ensuring follow up even when doing well to prevent flares in the future. He understands and agrees.  --Continue Rowasa enemas nightly, can consider tapering it down if improving with Lialda    --Start Lialda 4.8g daily every morning  --We will obtain CRP/CBC/ESR  --Fecal Calprotectin after 8 weeks of starting Lialda   --If no improvement or flares up again we might consider Entyvio and Uceris   --Dexa scan   --F/U in 3 months with Ari De       IBD Health Care Maintenance:    Vaccinations:  -- Influenza (every year): Recommend yearly  -- TdaP (every 10 years): Last given 2009  -- Pneumococcal Pneumonia (once then every 5 years): Has not received     One time confirmation of immunity or serologies:  -- Hepatitis A (serologies or immunizations): 2009  -- Hepatitis B (serologies or immunizations): 1997, serologies show immunity  -- Zoster vaccination (>59 yo, discuss if over 50): has not received  -- MMR:2002  -- HPV (all aged 18-26): has not received  -- Meningococcal meningitis (all patients at risk for meningitis): 2009      Bone mineral density screening   -- Recommend all patients supplement with calcium and vitamin D  -- Given prior steroid use recommend DEXA if not already done     Cancer Screening:  Colon cancer screening:  Colonoscopy done 1/23/19.     Skin cancer screening: Since patient is not immunocompromised, this is per patient's dermatologist recommendations.     Misc:  -- Avoid tobacco use  -- Avoid NSAIDs as there is potentially a 25% chance of causing an IBD flare     RTC 6 months (in [place])     Thank you for this consultation.  It was a pleasure to participate in the care of this patient; please contact us with any further questions.     Ari De PA-C  Division of Gastroenterology, Hepatology and Nutrition  [facility]     HPI:   Fabian is a 23 y.o male with likely diagnosis of Crohn's Disease (possible duodenal, ileal and colon) however we do not have original endoscopy reports. He is currently on Lialda 4.8 g daily.  After colonoscopy 1/2019, Rowasa enemas were also added, however patient has now discontinued these. Colonoscopy 1/2019 showed mild to minimal  chronic colitis with an endoscopic score of 9 mainly in the left sided colon. Prednisone taper was initiated and successfully completed after this time.  His MRE in 2018 also showed inflammation along the colon but normal small bowel.     Patient currently reports 3-4 BM non-bloody stools that are formed.  He denies any urgency or nighttime stools. No abdominal pain or any EIM. No fever or chills.     Patient will be moving to [facility] as of July 2019.    ROS:    No fevers or chills  No weight loss  No blurry vision, double vision or change in vision  No sore throat  No lymphadenopathy  No headache, paraesthesias, or weakness in a limb  No shortness of breath or wheezing  No chest pain or pressure  No arthralgias or myalgias  No rashes or skin changes  No odynophagia or dysphagia  No BRBPR, hematochezia, melena  No dysuria, frequency or urgency  No hot/cold intolerance or polyria  + anxiety or depression    Extra intestinal manifestations of IBD:  No uveitis/episcleritis  No aphthous ulcers   No arthritis   No erythema nodosum/pyoderma gangrenosum.     PERTINENT PAST MEDICAL HISTORY:  Past Medical History:   Diagnosis Date     Crohn's disease (H)        PREVIOUS SURGERIES:  None    PREVIOUS ENDOSCOPY:  As above    ALLERGIES:   No Known Allergies    PERTINENT MEDICATIONS:    Current Outpatient Medications:      calcium carbonate (OS-HARLAN 500 MG [place]. [place]) 500 MG tablet, Take 2,000 mg by mouth 2 times daily, Disp: , Rfl:      Cholecalciferol (VITAMIN D) 2000 UNITS CAPS, Take 50,000 Units by mouth , Disp: , Rfl:      mesalamine (LIALDA) 1.2 g EC tablet, Take 4 tablets (4,800 mg) by mouth every morning, Disp: 180 tablet, Rfl: 3     mesalamine (ROWASA) 4 g enema, Place 1 enema (4 g) rectally At Bedtime, Disp: 30 Bottle, Rfl: 0     Nutritional Supplements (ENSURE COMPLETE SHAKE PO), Take  by mouth., Disp: , Rfl:     SOCIAL HISTORY:  Social History     Socioeconomic History     Marital status: Single     Spouse name: Not  "on file     Number of children: Not on file     Years of education: Not on file     Highest education level: Not on file   Occupational History     Not on file   Social Needs     Financial resource strain: Not on file     Food insecurity:     Worry: Not on file     Inability: Not on file     Transportation needs:     Medical: Not on file     Non-medical: Not on file   Tobacco Use     Smoking status: Never Smoker     Smokeless tobacco: Never Used   Substance and Sexual Activity     Alcohol use: No     Drug use: No     Sexual activity: Not on file   Lifestyle     Physical activity:     Days per week: Not on file     Minutes per session: Not on file     Stress: Not on file   Relationships     Social connections:     Talks on phone: Not on file     Gets together: Not on file     Attends Rastafari service: Not on file     Active member of club or organization: Not on file     Attends meetings of clubs or organizations: Not on file     Relationship status: Not on file     Intimate partner violence:     Fear of current or ex partner: Not on file     Emotionally abused: Not on file     Physically abused: Not on file     Forced sexual activity: Not on file   Other Topics Concern     Not on file   Social History Narrative     Not on file       FAMILY HISTORY:  No history of CRC or IBD in his family      PHYSICAL EXAMINATION:  Constitutional: aaox3, cooperative, pleasant, not dyspneic/diaphoretic, no acute distress  Vitals reviewed: /60   Pulse 70   Ht 1.753 m (5' 9\")   Wt 80.8 kg (178 lb 3.2 oz)   SpO2 96%   BMI 26.32 kg/m    Wt:   Wt Readings from Last 2 Encounters:   02/04/19 78.4 kg (172 lb 12.8 oz)   01/23/19 77.1 kg (170 lb)      Eyes: Sclera anicteric/injected  Ears/nose/mouth/throat: Normal oropharynx without ulcers or exudate, mucus membranes moist, hearing intact  Neck: supple, thyroid normal size  CV: No edema  Respiratory: Unlabored breathing  Lymph: No axillary, submandibular, supraclavicular or " inguinal lymphadenopathy  Abd:  Nondistended, +bs, no hepatosplenomegaly, nontender, no peritoneal signs  Skin: warm, perfused, no jaundice  Psych: Normal affect  MSK: Normal gait      PERTINENT STUDIES:  Most recent CBC:  Recent Labs   Lab Test 02/06/19  1406 11/14/18  1431   WBC 7.6 12.7*   HGB 16.7 15.8   HCT 48.6 46.4    460*     Most recent hepatic panel:  Recent Labs   Lab Test 11/14/18  1431 09/05/18  1537   ALT 21 22   AST 18 14     Most recent creatinine:  Recent Labs   Lab Test 09/05/18  1537 07/12/17  1316   CR 1.17 1.10           Answers for HPI/ROS submitted by the patient on 5/1/2019   General Symptoms: No  Skin Symptoms: No  HENT Symptoms: No  EYE SYMPTOMS: No  HEART SYMPTOMS: No  LUNG SYMPTOMS: No  INTESTINAL SYMPTOMS: No  URINARY SYMPTOMS: No  REPRODUCTIVE SYMPTOMS: No  SKELETAL SYMPTOMS: No  BLOOD SYMPTOMS: No  NERVOUS SYSTEM SYMPTOMS: No  MENTAL HEALTH SYMPTOMS: No

## 2019-05-01 NOTE — PATIENT INSTRUCTIONS
It was a pleasure taking care of you today.  I've included a brief summary of our discussion and care plan from today's visit below.  Please review this information with your primary care provider.  ______________________________________________________________________    My recommendations are summarized as follows:    -- Continue lialda 4.8 g daily  -- Stool sample when able   -- Patient with IBD we recommend supplementation vitamin D 1000 units daily and calcium 500 mg twice daily.  -- Vaccines/immunizations to be updated: Recommend yearly flu shot, pneumonia vaccines (Prevnar 13 then 8 weeks later Pneumovax 23 then 5 years later Pneumovax 23), tetanus every 10 years.  -- No NSAIDs (ibuprofen, or anything containing ibuprofen)  -- Continue to follow with Dr. Pak     You can schedule your DEXA scan by calling [phone].  The scan is done on the first floor at the [facility].  The appointment is 30 minutes.  It is recommended that you wear light clothing, no zippers and no metal (including underwire of bra)  You are to withhold your calcium supplement for 24 hours prior to the scan.      For additional resources about inflammatory bowel disease visit [email or web address]      Return to GI Clinic before you leave for [place] to review your progress.    ______________________________________________________________________    Who do I call with any questions after my visit?  Please be in touch if there are any further questions that arise following today's visit.  There are multiple ways to contact your gastroenterology care team.        During business hours, you may reach a Gastroenterology nurse at [phone], option 3.       To schedule or reschedule an appointment, please call [phone].       You can always send a secure message through [employer].  MyChart messages are answered by your nurse or doctor typically within 24 hours.  Please allow extra time on  weekends and holidays.        For urgent/emergent questions after business hours, you may reach the on-call GI Fellow by contacting the [facility]  at [phone].      In order for your refill to be processed in a timely fashion, it is your responsibility to ensure you follow the recommendations from your provider regarding your laboratory studies and follow up appointments.       How will I get the results of any tests ordered?    You will receive all of your results.  If you have signed up for LOVEFiLMhart, any tests ordered at your visit will be available to you after your physician reviews them.  Typically this takes 1-2 weeks.  If there are urgent results that require a change in your care plan, your physician or nurse will call you to discuss the next steps.      What is Effektif?  Effektif is a secure way for you to access all of your healthcare records from the [facility].  It is a web based computer program, so you can sign on to it from any location.  It also allows you to send secure messages to your care team.  I recommend signing up for Effektif access if you have not already done so and are comfortable with using a computer.      How to I schedule a follow-up visit?  If you did not schedule a follow-up visit today, please call [phone] to schedule a follow-up office visit.         Sincerely,    Ari De PA-C  [facility]  Division of Gastroenterology

## 2019-05-01 NOTE — NURSING NOTE
"Chief Complaint   Patient presents with     RECHECK     Return IBD, 3 months follow up       Vitals:    05/01/19 1203   BP: 136/60   Pulse: 70   SpO2: 96%   Weight: 80.8 kg (178 lb 3.2 oz)   Height: 1.753 m (5' 9\")       Body mass index is 26.32 kg/m .    Nancy Biswas CMA    "

## 2019-05-06 ENCOUNTER — OFFICE VISIT (OUTPATIENT)
Dept: GASTROENTEROLOGY | Facility: CLINIC | Age: 24
End: 2019-05-06
Payer: COMMERCIAL

## 2019-05-06 VITALS — BODY MASS INDEX: 26.14 KG/M2 | WEIGHT: 176.5 LBS | HEIGHT: 69 IN

## 2019-05-06 DIAGNOSIS — Z71.3 NUTRITIONAL COUNSELING: ICD-10-CM

## 2019-05-06 DIAGNOSIS — K50.80 CROHN'S DISEASE OF BOTH SMALL AND LARGE INTESTINE WITHOUT COMPLICATION (H): Primary | ICD-10-CM

## 2019-05-06 ASSESSMENT — MIFFLIN-ST. JEOR: SCORE: 1791.23

## 2019-05-06 NOTE — LETTER
5/6/2019       RE: Ferdinand Anglin  [address] [place] [address]     Dear Colleague,    Thank you for referring your patient, Ferdinand Anglin, to the [facility] GASTROENTEROLOGY AND IBD CLINIC at [facility]. Please see a copy of my visit note below.    [facility] Outpatient Medical Nutrition Therapy      Time Spent: 60 Minutes  Session Type:  Initial Individual Session  Referring Physician: COMFORT Sequeira  Reason for RD Visit: IBD     Nutrition Plan: Continue to consume a plant-based diet.    Recommendations for MD/Provider to order: None at this time    Malnutrition Diagnosis: Patient does not meet the criteria necessary for diagnosing malnutrition    Nutrition Assessment:  Patient is here for intial visit with Registered Dietitian (RD).  Patient is a 24 year old male with history of Crohn's (inflammatory; possible duodenal, ileal, colon) diagnosed at the age of 8. Currently maintained on Lialda. He reports well controlled symptoms at this time (3-4 formed BMs/day). Presents today to discuss basics of diet in IBD, as well as ideas to help navigate his move to [place] in July.    Past Surgical History: No IBD surgical history    Symptom Review  1. Nausea/vomiting? No  2. Heartburn? No  3. Bloating? No  4. Belching? No  5. Feeling full quickly? No  6. Decreased appetite? No  7. Weight loss/gain? Yes: Weight gain  8. Constipation/Diarrhea? No. 3-4 BMs/day - formed  9. Urgency? No  10. Incomplete Bowel Emptying? No  11. Abdominal pain/pain with or without eating? No  12. Feeling tired? No    IBD-Q Score History  IBDQ Score Date IBDQ - Total Score  (Higher score better)   5/1/2019 59   2/4/2019 60   11/13/2018 44   7/9/2017 54   9/13/2016 Incomplete       Dietary beliefs and Practices  1.  Did you modify your diet leading up to diagnosis OR Have you modified your diet since diagnosis?  Yes: No dairy products - specifically lactose containing. Also tries to stay away from  "any sort of fried foods. More recently has been avoiding spicy foods. Lately tries not to eat cheese (even goat cheese). Limits animal products - really only consumes meat. Limits concentrated sweets.  2. Do you believe that food/nutrition is an important part of your disease management? Yes: More important than medications  3.  Are there specific foods you avoid? Yes: Symptom management and Prevent relapse. Would never drink milk or eat ice cream  4.  Do you believe that specific foods can cause relapse? Yes: Spicy foods, Alcohol  5.  Are there specific foods that you feel help? (symptoms/relapse) Yes: Tries to eat things that he considers to be \"more easily digestible\" - in the past all liquid diets like ensure or Peptamen  6.  Have you received prior advice on diet?  No   A. If so, source? N/A  7.  Have you, or do you follow, a specific diet?  No   A. Which one(s)?  N/A   B. Did/Do you find it beneficial?  N/A    I. If able to articulate, what specifically is the benefit? N/A  8.  Do you take any vitamin, mineral, or herbal supplements? No. Supposed to be taking Calcium/vitamin D and fish oil.  9.  Do you use any calorie/protein supplements?  No  10.  Do you think IBD has influenced your pleasure in eating?  Yes: Feels like he has an allergy and a \"dietary restriction disease\"  11.  Do you feel stressed or anxious about eating? If so why? No  11.  Do you avoid eating in social settings (e.g. Restaurants)?  Yes: Sometimes. Depends what it is and how he's feeling    Diet Recall:  (Typical Day)  Meal Name Time Food    Breakfast  Cereal (large bowl; oatmeal crisp) with almond milk        Lunch  [place] with veggie burgers, tomato, lettuce, bun        Dinner  Cooked salmon and white rice, salad, and asparagus        Snacks  Fruit/Vegetable; Oatmeal bar or granola bar; [place] or bowl of cereal if particularly hungry   Beverages  Mostly water, almond milk (Silk), and OJ (Fortified with Ca+ and vit D)   Alcohol   " "Rarely currently     Frequency of eating/taking out meals: 1x/wk (chipotle)  Food access/availability: Currently living with parents. Financially will be ok once moves to [place], but it will be his first time living alone and he's unsure how easy it will be to get groceries.  Food preparation confidence/abilities: Limited.    Anthropometrics:   Height: 5' 9.016\"  Weight: 176 lbs 8 oz  BMI: Body mass index is 26.05 kg/m .    Weight History:  Wt Readings from Last 10 Encounters:   19 80.1 kg (176 lb 8 oz)   19 80.8 kg (178 lb 3.2 oz)   19 78.4 kg (172 lb 12.8 oz)   19 77.1 kg (170 lb)   18 72.7 kg (160 lb 3.2 oz)   18 76.9 kg (169 lb 8 oz)   07/10/17 76 kg (167 lb 9.6 oz)   10/14/16 74.8 kg (165 lb) (65 %)*   16 75.4 kg (166 lb 3.2 oz) (67 %)*   10/26/15 75.8 kg (167 lb) (72 %)*     * Growth percentiles are based on CDC (Boys, 2-20 Years) data.     Usual Weight: 170-175 pounds  Weight change in past 6 months: Weight gain    Labs: Reviewed  Pertinent Medications/vitamin and mineral supplements:   Outpatient Encounter Medications as of 2019   Medication Sig Dispense Refill     calcium carbonate (OS-HARLAN 500 MG [place]. [place]) 500 MG tablet Take 2,000 mg by mouth 2 times daily       Cholecalciferol (VITAMIN D) 2000 UNITS CAPS Take 50,000 Units by mouth        [] mesalamine (LIALDA) 1.2 g EC tablet Take 4 tablets (4,800 mg) by mouth every morning 180 tablet 3     mesalamine (ROWASA) 4 g enema Place 1 enema (4 g) rectally At Bedtime (Patient not taking: Reported on 2019) 30 Bottle 0     Nutritional Supplements (ENSURE COMPLETE SHAKE PO) Take  by mouth.       [] polyethylene glycol (GOLYTELY/NULYTELY) 236 g suspension Take 4,000 mLs (4 L) by mouth once for 1 dose 4000 mL 1     No facility-administered encounter medications on file as of 2019.        Food Allergies: No  Food Intolerances: Lactose  Physical Activity: Most days bikes for at least 10 miles and goes to " "gym or [employer] for several hours  Estimated Nutrition Needs based on most recent body weight of 176 lbs 8 oz  : 2000 calories (25 kcals/kg), 80 g protein (1 g/kg), 2000 ml fluids (~1 ml/kcal or total fluids per MD).     MALNUTRITION:  % Weight Loss:  None noted  % Intake:  No decreased intake noted  Subcutaneous Fat Loss:  None observed  Muscle Loss:  None observed  Fluid Retention:  None noted    Nutrition Diagnosis:    Food and nutrition related knowledge deficit related to IBD as evidenced by need for diet education.    Nutrition Prescription: Regular diet    Nutrition Intervention:    Nutrition Education/Counseling:  Discussed IBD and diet history. Ferdinand notes that he has found that \"easily digestible foods\" such as Peptamen and Ensure are well tolerated during a flare and utilizes softer foods while recovering from flares to mitigate symptoms. He reports good success with this in the past. He currently follows a personalized exclusion diet that he has developed over the years. He avoids all dairy, fried foods, spicy foods, and concentrated sweets. He notes that this diet is easy to navigate while living at home with his parents, but is nervous about the prospect of moving to [place] and living on his own this summer. He notes that his new place of employment provides breakfast and lunch and is anxious about navigating this with his diet restrictions. He also tries to limit alcohol intake, but anticipates that the culture at his job in [place] will encourage drinking. He also expresses concerns about navigating evening meal choices. He notes that he will be living with a friend of his and thinks it will be difficult to cook evening meals. He is most concerned that he will find himself in a situation where he is really hungry, but the food available will be food that he would prefer to avoid. Discussed strategies to help navigate these situations such as keeping granola bars available to curb strong hunger " signals and help manage intake of symptom-inducing foods (e.g. Pizza). Also discussed basics of diet in IBD. He is already following a plant-based diet. Encouraged continuation of this. Provided the anti-inflammatory diet in IBD food list and website to help guide food choices and provide a resource for recipes. Encouraged him to practice and develop cooking skills while still living at home.    Educational Materials Provided: Anti-Inflammatory diet in IBD food list  Patient verbalized understanding of education provided. See all recommendations under Goals.    Goals:  1. Try to continue to follow more of a plant-based diet    2. Try to keep snack-type items such as granola bars readily available so that you can curb strong hunger sensations    Nutrition Monitoring and Evaluation: Will monitor adherence to nutrition recommendations at future RD visits.     Further Medical Nutrition Therapy: PRN  Next Appointment (if applicable): PRN  Patient was encouraged to call/contact RD with any further questions.    Again, thank you for allowing me to participate in the care of your patient.      Sincerely,    Isaak Pickett RD

## 2019-05-06 NOTE — PATIENT INSTRUCTIONS
Lele Streeter,    It was great meeting you today. Below is a summary of what we discussed:    1. Try to continue to follow more of a plant-based diet    2. Try to keep snack-type items such as granola bars readily available so that you can curb strong hunger sensations    Best regards,  Isaak Pickett, PhD, RD

## 2019-06-18 ENCOUNTER — TELEPHONE (OUTPATIENT)
Dept: GASTROENTEROLOGY | Facility: CLINIC | Age: 24
End: 2019-06-18

## 2019-06-21 ENCOUNTER — OFFICE VISIT (OUTPATIENT)
Dept: GASTROENTEROLOGY | Facility: CLINIC | Age: 24
End: 2019-06-21
Payer: COMMERCIAL

## 2019-06-21 VITALS
SYSTOLIC BLOOD PRESSURE: 125 MMHG | HEIGHT: 69 IN | WEIGHT: 173.8 LBS | DIASTOLIC BLOOD PRESSURE: 68 MMHG | BODY MASS INDEX: 25.74 KG/M2 | OXYGEN SATURATION: 94 % | HEART RATE: 66 BPM

## 2019-06-21 DIAGNOSIS — K50.10 CROHN'S DISEASE OF LARGE INTESTINE WITHOUT COMPLICATION (H): ICD-10-CM

## 2019-06-21 DIAGNOSIS — K50.80 CROHN'S DISEASE OF BOTH SMALL AND LARGE INTESTINE WITHOUT COMPLICATION (H): Primary | ICD-10-CM

## 2019-06-21 RX ORDER — MESALAMINE 1.2 G/1
4800 TABLET, DELAYED RELEASE ORAL EVERY MORNING
Qty: 180 TABLET | Refills: 3 | Status: SHIPPED | OUTPATIENT
Start: 2019-06-21

## 2019-06-21 ASSESSMENT — PAIN SCALES - GENERAL: PAINLEVEL: NO PAIN (0)

## 2019-06-21 ASSESSMENT — MIFFLIN-ST. JEOR: SCORE: 1779.04

## 2019-06-21 NOTE — NURSING NOTE
"Chief Complaint   Patient presents with     RECHECK     Return IBD       Vitals:    06/21/19 1304   BP: 125/68   Pulse: 66   SpO2: 94%   Weight: 78.8 kg (173 lb 12.8 oz)   Height: 1.753 m (5' 9.02\")       Body mass index is 25.65 kg/m .    Nancy Biswas CMA    "

## 2019-06-21 NOTE — PATIENT INSTRUCTIONS
It was a pleasure taking care of you today.  I've included a brief summary of our discussion and care plan from today's visit below.  Please review this information with your primary care provider.  ______________________________________________________________________    My recommendations are summarized as follows:    -- Continue lialda 4.8g per day   -- Labs today  -- Stool sample when able  -- Next endoscopic assessment: pending the above  -- Patient with IBD we recommend supplementation vitamin D 1000 units daily and calcium 500 mg twice daily.  -- Vaccines/immunizations to be updated: Recommend yearly flu shot, pneumonia vaccines (Prevnar 13 then 8 weeks later Pneumovax 23 then 5 years later Pneumovax 23), tetanus every 10 years.  -- No NSAIDs (ibuprofen, or anything containing ibuprofen)     You can schedule your DEXA scan by calling [phone].  The scan is done on the first floor at the [facility] [place].  The appointment is 30 minutes.  It is recommended that you wear light clothing, no zippers and no metal (including underwire of bra)  You are to withhold your calcium supplement for 24 hours prior to the scan.    For additional resources about inflammatory bowel disease visit [email or web address]      Return to GI Clinic in 3-6 months in [place], [place] to review your progress.    ______________________________________________________________________    Who do I call with any questions after my visit?  Please be in touch if there are any further questions that arise following today's visit.  There are multiple ways to contact your gastroenterology care team.        During business hours, you may reach a Gastroenterology nurse at [phone], option 3.       To schedule or reschedule an appointment, please call [phone].       You can always send a secure message through [employer].  [employer] messages are answered by your nurse or doctor typically within 24 hours.  Please  allow extra time on weekends and holidays.        For urgent/emergent questions after business hours, you may reach the on-call GI Fellow by contacting the [facility]  at [phone].      In order for your refill to be processed in a timely fashion, it is your responsibility to ensure you follow the recommendations from your provider regarding your laboratory studies and follow up appointments.       How will I get the results of any tests ordered?    You will receive all of your results.  If you have signed up for [employer], any tests ordered at your visit will be available to you after your physician reviews them.  Typically this takes 1-2 weeks.  If there are urgent results that require a change in your care plan, your physician or nurse will call you to discuss the next steps.      What is picsell?  picsell is a secure way for you to access all of your healthcare records from the [facility].  It is a web based computer program, so you can sign on to it from any location.  It also allows you to send secure messages to your care team.  I recommend signing up for picsell access if you have not already done so and are comfortable with using a computer.      How to I schedule a follow-up visit?  If you did not schedule a follow-up visit today, please call [phone] to schedule a follow-up office visit.        Sincerely,    Ari De PA-C  [facility]  Division of Gastroenterology

## 2019-06-21 NOTE — PROGRESS NOTES
IBD CLINIC VISIT    CC/REFERRING MD:  Referred Self    REASON FOR CONSULTATION: follow up IBD    IBD HISTORY  Age at diagnosis: age 8  Extent of disease: On initial endoscopies he had gastritis and some duodenitis with granulomas in duodenum, also has had ileitis and colitis. Exact extent of involvement in colon on diagnosis not available, although colonoscopy 2007 showed some inflammation in rectosigmoid. Follow up EGD showed no granulomas. 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 Colonoscopy 1/23/19 with active left sided inflammation with a CD score of 9. Biopsies with mild and minimally active chronic colitis with no dysplasia. Recently finished a prednisone taper and patient doing fairly well.   Disease phenotype: inflammatory  Isi-anal disease: None  Current CD medications:   Lialda 4.8g daily  Rowasa enemas (has now discontinued)  Prior IBD surgeries: None  Prior IBD Medications: He has been on Pentasa in the past, anywhere from 500 b.i.d. to 2000 mg p.o. b.i.d.  He has been on-and-off prednisone over the years.  He has tried Entocort with no improvement in his symptoms.  He had been on Humira in the past with some question of improvement; however, he and his family all deny that the Humira helped him at all. That was back in 2013. Most recently retried on Pentasa, but switched to better colonic coverage with Lialda. Has also been on cortenemas in the past.    DRUG MONITORING  TPMT enzyme activity: 22.7, normal 9/16/16     6-TGN/6-MMPN levels: -- N/A     Biologic concentration:-- N/A    DISEASE ASSESSMENT  Labs  Recent Labs   Lab Test 02/06/19  1406 11/14/18  1431   CRP <2.9 40.0*   SED 9 38*     Endoscopic assessment: colonoscopy 1/2019           - Simple Endoscopic Score for Crohn's Disease: 9,                         mucosal inflammatory changes secondary to Crohn's                        disease with colonic involvement.                        - Continuous pseudopolyps from 50 to 70cm. Active                        disease mostly left sided (ulceration only noted in                        rectum).                        - One 3 mm polyp at 70 cm proximal to the anus, removed                        with a cold snare. Resected and retrieved.   Enterography: 2/5/18 MRE   IMPRESSION:   1. Active inflammation of the hepatic flexure, proximal to mid  transverse, descending, and sigmoid colon and upper rectum.  2. No active small bowel inflammation.    Fecal calprotectin: 1589 11/30/18  C diff: NEGATIVE 11/30/18    IBDQ Score Date IBDQ - Total Score  (Higher score better)   6/21/2019 59   5/1/2019 59   2/4/2019 60   11/13/2018 44   7/9/2017 54   9/13/2016 Incomplete       ASSESSMENT/PLAN:  Fabian is a 24 y.o male with likely diagnosis of Crohn's Disease (possible duodenal, ileal and colon) however we do not have original endoscopy reports.     1. Crohn's disease (possible duodenal, ileal and colonic):  Last endoscopic assessment 1/2019 showed mild to minimal chronic colitis with an endoscopic score of 9 mainly in the left sided colon. Symptomatically improved with lialda 4.8 g daily and has discontinued rectal therapies.  The endoscopic evaluation was considered to be in clinical remission with mild endoscopic disease on his current medication.  Patient also understood that if he flares up again he will likely required the need of Entyvio (biologic) and this will be a more long term medication option rather than prednisone. Since we have now switched to lialda from Pentasa, we will also obtain a fecal calprotectin to evaluate response. Patient is moving to [place] as of July 2019 ([place]) and will require transition in care.  We discussed the importance of taking medications as discussed and ensuring follow up even when doing  well to prevent flares in the future. He understands and agrees.  --Start Lialda 4.8g daiy  --Labs today CRP/CBC/ESR/LFTs  --Fecal Calprotectin  --If no improvement or flares up again we might consider Entyvio and Uceris   --Dexa scan   -- Establish care with [facility] provider    IBD Health Care Maintenance:    Vaccinations:  -- Influenza (every year): Recommend yearly  -- TdaP (every 10 years): Last given 2009  -- Pneumococcal Pneumonia (once then every 5 years): Has not received     One time confirmation of immunity or serologies:  -- Hepatitis A (serologies or immunizations): 2009  -- Hepatitis B (serologies or immunizations): 1997, serologies show immunity  -- Zoster vaccination (>61 yo, discuss if over 50): has not received  -- MMR:2002  -- HPV (all aged 18-26): has not received  -- Meningococcal meningitis (all patients at risk for meningitis): 2009      Bone mineral density screening   -- Recommend all patients supplement with calcium and vitamin D  -- Given prior steroid use recommend DEXA if not already done     Cancer Screening:  Colon cancer screening:  Colonoscopy done 1/23/19.     Skin cancer screening: Since patient is not immunocompromised, this is per patient's dermatologist recommendations.     Misc:  -- Avoid tobacco use  -- Avoid NSAIDs as there is potentially a 25% chance of causing an IBD flare     RTC 6 months (in [place])     Thank you for this consultation.  It was a pleasure to participate in the care of this patient; please contact us with any further questions.     Ari De PA-C  Division of Gastroenterology, Hepatology and Nutrition  [facility]     HPI:   Fabian is a 24 y.o male with likely diagnosis of Crohn's Disease (possible duodenal, ileal and colon) however we do not have original endoscopy reports. He is currently on Lialda 4.8 g daily.  After colonoscopy 1/2019, Rowasa enemas were also added, however patient has now discontinued these. Colonoscopy 1/2019 showed  mild to minimal chronic colitis with an endoscopic score of 9 mainly in the left sided colon. Prednisone taper was initiated and successfully completed after this time.  His MRE in 2018 also showed inflammation along the colon but normal small bowel.     Patient currently reports 3 BM non-bloody stools that are formed.  He has minimal urgency, denies nighttime stools. No abdominal pain or any EIM. No fever or chills.     Patient will be moving to [facility] as of July 2019.     HBI  General well-being very well = 0  Abdominal pain None = 0  Number of liquid stools per day 0  Abdominal mass None = 0  Current Complications none    ROS:    No fevers or chills  No weight loss  No blurry vision, double vision or change in vision  No sore throat  No lymphadenopathy  No headache, paraesthesias, or weakness in a limb  No shortness of breath or wheezing  No chest pain or pressure  No arthralgias or myalgias  No rashes or skin changes  No odynophagia or dysphagia  No BRBPR, hematochezia, melena  No dysuria, frequency or urgency  No hot/cold intolerance or polyria  + anxiety or depression    Extra intestinal manifestations of IBD:  No uveitis/episcleritis  No aphthous ulcers   No arthritis   No erythema nodosum/pyoderma gangrenosum.     PERTINENT PAST MEDICAL HISTORY:  Past Medical History:   Diagnosis Date     Crohn's disease (H)        PREVIOUS SURGERIES:  None    PREVIOUS ENDOSCOPY:  As above    ALLERGIES:   No Known Allergies    PERTINENT MEDICATIONS:    Current Outpatient Medications:      calcium carbonate (OS-HARLAN 500 MG [place]. [place]) 500 MG tablet, Take 2,000 mg by mouth 2 times daily, Disp: , Rfl:      Cholecalciferol (VITAMIN D) 2000 UNITS CAPS, Take 50,000 Units by mouth , Disp: , Rfl:      mesalamine (ROWASA) 4 g enema, Place 1 enema (4 g) rectally At Bedtime (Patient not taking: Reported on 5/1/2019), Disp: 30 Bottle, Rfl: 0     Nutritional Supplements (ENSURE COMPLETE SHAKE PO), Take  by mouth., Disp: , Rfl:  "    SOCIAL HISTORY:  Social History     Socioeconomic History     Marital status: Single     Spouse name: Not on file     Number of children: Not on file     Years of education: Not on file     Highest education level: Not on file   Occupational History     Not on file   Social Needs     Financial resource strain: Not on file     Food insecurity:     Worry: Not on file     Inability: Not on file     Transportation needs:     Medical: Not on file     Non-medical: Not on file   Tobacco Use     Smoking status: Never Smoker     Smokeless tobacco: Never Used   Substance and Sexual Activity     Alcohol use: No     Drug use: No     Sexual activity: Not on file   Lifestyle     Physical activity:     Days per week: Not on file     Minutes per session: Not on file     Stress: Not on file   Relationships     Social connections:     Talks on phone: Not on file     Gets together: Not on file     Attends Hoahaoism service: Not on file     Active member of club or organization: Not on file     Attends meetings of clubs or organizations: Not on file     Relationship status: Not on file     Intimate partner violence:     Fear of current or ex partner: Not on file     Emotionally abused: Not on file     Physically abused: Not on file     Forced sexual activity: Not on file   Other Topics Concern     Not on file   Social History Narrative     Not on file       FAMILY HISTORY:  No history of CRC or IBD in his family      PHYSICAL EXAMINATION:  Constitutional: aaox3, cooperative, pleasant, not dyspneic/diaphoretic, no acute distress  Vitals reviewed: /68   Pulse 66   Ht 1.753 m (5' 9.02\")   Wt 78.8 kg (173 lb 12.8 oz)   SpO2 94%   BMI 25.65 kg/m    Wt:   Wt Readings from Last 2 Encounters:   05/06/19 80.1 kg (176 lb 8 oz)   05/01/19 80.8 kg (178 lb 3.2 oz)      Eyes: Sclera anicteric/injected  Ears/nose/mouth/throat: Normal oropharynx without ulcers or exudate, mucus membranes moist, hearing intact  Neck: supple, thyroid " normal size  CV: No edema  Respiratory: Unlabored breathing  Lymph: No axillary, submandibular, supraclavicular or inguinal lymphadenopathy  Abd:  Nondistended, +bs, no hepatosplenomegaly, nontender, no peritoneal signs  Skin: warm, perfused, no jaundice  Psych: Normal affect  MSK: Normal gait      PERTINENT STUDIES:  Most recent CBC:  Recent Labs   Lab Test 02/06/19  1406 11/14/18  1431   WBC 7.6 12.7*   HGB 16.7 15.8   HCT 48.6 46.4    460*     Most recent hepatic panel:  Recent Labs   Lab Test 11/14/18  1431 09/05/18  1537   ALT 21 22   AST 18 14     Most recent creatinine:  Recent Labs   Lab Test 09/05/18  1537 07/12/17  1316   CR 1.17 1.10         Answers for HPI/ROS submitted by the patient on 6/21/2019   General Symptoms: No  Skin Symptoms: No  HENT Symptoms: No  EYE SYMPTOMS: No  HEART SYMPTOMS: No  LUNG SYMPTOMS: No  INTESTINAL SYMPTOMS: No  URINARY SYMPTOMS: No  REPRODUCTIVE SYMPTOMS: No  SKELETAL SYMPTOMS: No  BLOOD SYMPTOMS: No  NERVOUS SYSTEM SYMPTOMS: No  MENTAL HEALTH SYMPTOMS: No

## 2019-06-21 NOTE — LETTER
6/21/2019       RE: Ferdinand Anglin  [address] [place] [address]     Dear Colleague,    Thank you for referring your patient, Ferdinand Anglin, to the [facility] GA[facility] AND IBD CLINIC at [facility]. Please see a copy of my visit note below.    IBD CLINIC VISIT    CC/REFERRING MD:  Referred Self    REASON FOR CONSULTATION: follow up IBD    IBD HISTORY  Age at diagnosis: age 8  Extent of disease: On initial endoscopies he had gastritis and some duodenitis with granulomas in duodenum, also has had ileitis and colitis. Exact extent of involvement in colon on diagnosis not available, although colonoscopy 2007 showed some inflammation in rectosigmoid. Follow up EGD showed no granulomas. 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 Colonoscopy 1/23/19 with active left sided inflammation with a CD score of 9. Biopsies with mild and minimally active chronic colitis with no dysplasia. Recently finished a prednisone taper and patient doing fairly well.   Disease phenotype: inflammatory  Isi-anal disease: None  Current CD medications:   Lialda 4.8g daily  Rowasa enemas (has now discontinued)  Prior IBD surgeries: None  Prior IBD Medications: He has been on Pentasa in the past, anywhere from 500 b.i.d. to 2000 mg p.o. b.i.d.  He has been on-and-off prednisone over the years.  He has tried Entocort with no improvement in his symptoms.  He had been on Humira in the past with some question of improvement; however, he and his family all deny that the Humira helped him at all. That was back in 2013. Most recently retried on Pentasa, but switched to better colonic coverage with Lialda. Has also been on cortenemas in the past.    DRUG MONITORING  TPMT enzyme activity: 22.7,  normal 9/16/16     6-TGN/6-MMPN levels: -- N/A     Biologic concentration:-- N/A    DISEASE ASSESSMENT  Labs  Recent Labs   Lab Test 02/06/19  1406 11/14/18  1431   CRP <2.9 40.0*   SED 9 38*     Endoscopic assessment: colonoscopy 1/2019           - Simple Endoscopic Score for Crohn's Disease: 9,                        mucosal inflammatory changes secondary to Crohn's                        disease with colonic involvement.                        - Continuous pseudopolyps from 50 to 70cm. Active                        disease mostly left sided (ulceration only noted in                        rectum).                        - One 3 mm polyp at 70 cm proximal to the anus, removed                        with a cold snare. Resected and retrieved.   Enterography: 2/5/18 MRE   IMPRESSION:   1. Active inflammation of the hepatic flexure, proximal to mid  transverse, descending, and sigmoid colon and upper rectum.  2. No active small bowel inflammation.    Fecal calprotectin: 1589 11/30/18  C diff: NEGATIVE 11/30/18    IBDQ Score Date IBDQ - Total Score  (Higher score better)   6/21/2019 59   5/1/2019 59   2/4/2019 60   11/13/2018 44   7/9/2017 54   9/13/2016 Incomplete       ASSESSMENT/PLAN:  Fabian is a 24 y.o male with likely diagnosis of Crohn's Disease (possible duodenal, ileal and colon) however we do not have original endoscopy reports.     1. Crohn's disease (possible duodenal, ileal and colonic):  Last endoscopic assessment 1/2019 showed mild to minimal chronic colitis with an endoscopic score of 9 mainly in the left sided colon. Symptomatically improved with lialda 4.8 g daily and has discontinued rectal therapies.  The endoscopic evaluation was considered to be in clinical remission with mild endoscopic disease on his current medication.  Patient also understood that if he flares up again he will likely required the need of Entyvio (biologic) and this will be a more long term medication option rather than  prednisone. Since we have now switched to lialda from Pentasa, we will also obtain a fecal calprotectin to evaluate response. Patient is moving to [place] as of July 2019 ([place]) and will require transition in care.  We discussed the importance of taking medications as discussed and ensuring follow up even when doing well to prevent flares in the future. He understands and agrees.  --Start Lialda 4.8g daiy  --Labs today CRP/CBC/ESR/LFTs  --Fecal Calprotectin  --If no improvement or flares up again we might consider Entyvio and Uceris   --Dexa scan   -- Establish care with [facility] provider    IBD Health Care Maintenance:    Vaccinations:  -- Influenza (every year): Recommend yearly  -- TdaP (every 10 years): Last given 2009  -- Pneumococcal Pneumonia (once then every 5 years): Has not received     One time confirmation of immunity or serologies:  -- Hepatitis A (serologies or immunizations): 2009  -- Hepatitis B (serologies or immunizations): 1997, serologies show immunity  -- Zoster vaccination (>61 yo, discuss if over 50): has not received  -- MMR:2002  -- HPV (all aged 18-26): has not received  -- Meningococcal meningitis (all patients at risk for meningitis): 2009      Bone mineral density screening   -- Recommend all patients supplement with calcium and vitamin D  -- Given prior steroid use recommend DEXA if not already done     Cancer Screening:  Colon cancer screening:  Colonoscopy done 1/23/19.     Skin cancer screening: Since patient is not immunocompromised, this is per patient's dermatologist recommendations.     Misc:  -- Avoid tobacco use  -- Avoid NSAIDs as there is potentially a 25% chance of causing an IBD flare     RTC 6 months (in [place])     Thank you for this consultation.  It was a pleasure to participate in the care of this patient; please contact us with any further questions.     Ari De PA-C  Division of Gastroenterology, Hepatology and Nutrition  [facility]     HPI:    Fabian is a 24 y.o male with likely diagnosis of Crohn's Disease (possible duodenal, ileal and colon) however we do not have original endoscopy reports. He is currently on Lialda 4.8 g daily.  After colonoscopy 1/2019, Rowasa enemas were also added, however patient has now discontinued these. Colonoscopy 1/2019 showed mild to minimal chronic colitis with an endoscopic score of 9 mainly in the left sided colon. Prednisone taper was initiated and successfully completed after this time.  His MRE in 2018 also showed inflammation along the colon but normal small bowel.     Patient currently reports 3 BM non-bloody stools that are formed.  He has minimal urgency, denies nighttime stools. No abdominal pain or any EIM. No fever or chills.     Patient will be moving to [facility] as of July 2019.     HBI  General well-being  very well = 0  Abdominal pain  None = 0  Number of liquid stools per day 0  Abdominal mass None = 0  Current Complications none    ROS:    No fevers or chills  No weight loss  No blurry vision, double vision or change in vision  No sore throat  No lymphadenopathy  No headache, paraesthesias, or weakness in a limb  No shortness of breath or wheezing  No chest pain or pressure  No arthralgias or myalgias  No rashes or skin changes  No odynophagia or dysphagia  No BRBPR, hematochezia, melena  No dysuria, frequency or urgency  No hot/cold intolerance or polyria  + anxiety or depression    Extra intestinal manifestations of IBD:  No uveitis/episcleritis  No aphthous ulcers   No arthritis   No erythema nodosum/pyoderma gangrenosum.     PERTINENT PAST MEDICAL HISTORY:  Past Medical History:   Diagnosis Date     Crohn's disease (H)        PREVIOUS SURGERIES:  None    PREVIOUS ENDOSCOPY:  As above    ALLERGIES:   No Known Allergies    PERTINENT MEDICATIONS:    Current Outpatient Medications:      calcium carbonate (OS-HARLAN 500 MG [place]. CA) 500 MG tablet, Take 2,000 mg by mouth 2 times daily, Disp: , Rfl:       "Cholecalciferol (VITAMIN D) 2000 UNITS CAPS, Take 50,000 Units by mouth , Disp: , Rfl:      mesalamine (ROWASA) 4 g enema, Place 1 enema (4 g) rectally At Bedtime (Patient not taking: Reported on 5/1/2019), Disp: 30 Bottle, Rfl: 0     Nutritional Supplements (ENSURE COMPLETE SHAKE PO), Take  by mouth., Disp: , Rfl:     SOCIAL HISTORY:  Social History     Socioeconomic History     Marital status: Single     Spouse name: Not on file     Number of children: Not on file     Years of education: Not on file     Highest education level: Not on file   Occupational History     Not on file   Social Needs     Financial resource strain: Not on file     Food insecurity:     Worry: Not on file     Inability: Not on file     Transportation needs:     Medical: Not on file     Non-medical: Not on file   Tobacco Use     Smoking status: Never Smoker     Smokeless tobacco: Never Used   Substance and Sexual Activity     Alcohol use: No     Drug use: No     Sexual activity: Not on file   Lifestyle     Physical activity:     Days per week: Not on file     Minutes per session: Not on file     Stress: Not on file   Relationships     Social connections:     Talks on phone: Not on file     Gets together: Not on file     Attends Yarsani service: Not on file     Active member of club or organization: Not on file     Attends meetings of clubs or organizations: Not on file     Relationship status: Not on file     Intimate partner violence:     Fear of current or ex partner: Not on file     Emotionally abused: Not on file     Physically abused: Not on file     Forced sexual activity: Not on file   Other Topics Concern     Not on file   Social History Narrative     Not on file       FAMILY HISTORY:  No history of CRC or IBD in his family      PHYSICAL EXAMINATION:  Constitutional: aaox3, cooperative, pleasant, not dyspneic/diaphoretic, no acute distress  Vitals reviewed: /68   Pulse 66   Ht 1.753 m (5' 9.02\")   Wt 78.8 kg (173 lb 12.8 " oz)   SpO2 94%   BMI 25.65 kg/m     Wt:   Wt Readings from Last 2 Encounters:   05/06/19 80.1 kg (176 lb 8 oz)   05/01/19 80.8 kg (178 lb 3.2 oz)      Eyes: Sclera anicteric/injected  Ears/nose/mouth/throat: Normal oropharynx without ulcers or exudate, mucus membranes moist, hearing intact  Neck: supple, thyroid normal size  CV: No edema  Respiratory: Unlabored breathing  Lymph: No axillary, submandibular, supraclavicular or inguinal lymphadenopathy  Abd:  Nondistended, +bs, no hepatosplenomegaly, nontender, no peritoneal signs  Skin: warm, perfused, no jaundice  Psych: Normal affect  MSK: Normal gait      PERTINENT STUDIES:  Most recent CBC:  Recent Labs   Lab Test 02/06/19  1406 11/14/18  1431   WBC 7.6 12.7*   HGB 16.7 15.8   HCT 48.6 46.4    460*     Most recent hepatic panel:  Recent Labs   Lab Test 11/14/18  1431 09/05/18  1537   ALT 21 22   AST 18 14     Most recent creatinine:  Recent Labs   Lab Test 09/05/18  1537 07/12/17  1316   CR 1.17 1.10       Ari De PA-C

## 2019-07-02 DIAGNOSIS — K50.80 CROHN'S DISEASE OF BOTH SMALL AND LARGE INTESTINE WITHOUT COMPLICATION (H): ICD-10-CM

## 2019-07-02 LAB
BASOPHILS # BLD AUTO: 0 10E9/L (ref 0–0.2)
BASOPHILS NFR BLD AUTO: 0.1 %
CRP SERPL-MCNC: <2.9 MG/L (ref 0–8)
DIFFERENTIAL METHOD BLD: NORMAL
EOSINOPHIL # BLD AUTO: 0.2 10E9/L (ref 0–0.7)
EOSINOPHIL NFR BLD AUTO: 2.2 %
ERYTHROCYTE [DISTWIDTH] IN BLOOD BY AUTOMATED COUNT: 12.4 % (ref 10–15)
ERYTHROCYTE [SEDIMENTATION RATE] IN BLOOD BY WESTERGREN METHOD: 7 MM/H (ref 0–15)
HCT VFR BLD AUTO: 50.4 % (ref 40–53)
HGB BLD-MCNC: 17.7 G/DL (ref 13.3–17.7)
LYMPHOCYTES # BLD AUTO: 3.4 10E9/L (ref 0.8–5.3)
LYMPHOCYTES NFR BLD AUTO: 44.6 %
MCH RBC QN AUTO: 31.7 PG (ref 26.5–33)
MCHC RBC AUTO-ENTMCNC: 35.1 G/DL (ref 31.5–36.5)
MCV RBC AUTO: 90 FL (ref 78–100)
MONOCYTES # BLD AUTO: 0.6 10E9/L (ref 0–1.3)
MONOCYTES NFR BLD AUTO: 7.3 %
NEUTROPHILS # BLD AUTO: 3.5 10E9/L (ref 1.6–8.3)
NEUTROPHILS NFR BLD AUTO: 45.8 %
PLATELET # BLD AUTO: 309 10E9/L (ref 150–450)
RBC # BLD AUTO: 5.58 10E12/L (ref 4.4–5.9)
WBC # BLD AUTO: 7.6 10E9/L (ref 4–11)

## 2019-07-02 PROCEDURE — 85025 COMPLETE CBC W/AUTO DIFF WBC: CPT | Performed by: PHYSICIAN ASSISTANT

## 2019-07-02 PROCEDURE — 36415 COLL VENOUS BLD VENIPUNCTURE: CPT | Performed by: PHYSICIAN ASSISTANT

## 2019-07-02 PROCEDURE — 86140 C-REACTIVE PROTEIN: CPT | Performed by: PHYSICIAN ASSISTANT

## 2019-07-02 PROCEDURE — 80076 HEPATIC FUNCTION PANEL: CPT | Performed by: PHYSICIAN ASSISTANT

## 2019-07-02 PROCEDURE — 85652 RBC SED RATE AUTOMATED: CPT | Performed by: PHYSICIAN ASSISTANT

## 2019-07-03 DIAGNOSIS — K50.80 CROHN'S DISEASE OF BOTH SMALL AND LARGE INTESTINE WITHOUT COMPLICATION (H): ICD-10-CM

## 2019-07-03 LAB
ALBUMIN SERPL-MCNC: 4.4 G/DL (ref 3.4–5)
ALP SERPL-CCNC: 122 U/L (ref 40–150)
ALT SERPL W P-5'-P-CCNC: 29 U/L (ref 0–70)
AST SERPL W P-5'-P-CCNC: 19 U/L (ref 0–45)
BILIRUB DIRECT SERPL-MCNC: <0.1 MG/DL (ref 0–0.2)
BILIRUB SERPL-MCNC: 0.5 MG/DL (ref 0.2–1.3)
PROT SERPL-MCNC: 8.4 G/DL (ref 6.8–8.8)

## 2019-07-03 PROCEDURE — 83993 ASSAY FOR CALPROTECTIN FECAL: CPT | Performed by: PHYSICIAN ASSISTANT

## 2019-07-05 LAB — CALPROTECTIN STL-MCNT: 36.2 MG/KG (ref 0–49.9)

## 2019-11-03 ENCOUNTER — HEALTH MAINTENANCE LETTER (OUTPATIENT)
Age: 24
End: 2019-11-03

## 2020-11-16 ENCOUNTER — HEALTH MAINTENANCE LETTER (OUTPATIENT)
Age: 25
End: 2020-11-16

## 2021-09-18 ENCOUNTER — HEALTH MAINTENANCE LETTER (OUTPATIENT)
Age: 26
End: 2021-09-18

## 2022-01-08 ENCOUNTER — HEALTH MAINTENANCE LETTER (OUTPATIENT)
Age: 27
End: 2022-01-08

## 2022-11-19 ENCOUNTER — HEALTH MAINTENANCE LETTER (OUTPATIENT)
Age: 27
End: 2022-11-19

## 2023-04-15 ENCOUNTER — HEALTH MAINTENANCE LETTER (OUTPATIENT)
Age: 28
End: 2023-04-15

## (undated) DEVICE — TAPE DURAPORE 3" SILK 1538-3

## (undated) DEVICE — SUCTION MANIFOLD NEPTUNE 2 SYS 4 PORT 0702-020-000

## (undated) DEVICE — ENDO FORCEP ENDOJAW BIOPSY 2.8MMX230CM FB-220U

## (undated) DEVICE — SOL WATER IRRIG 1000ML BOTTLE 2F7114

## (undated) DEVICE — SPECIMEN CONTAINER 3OZ W/FORMALIN 59901

## (undated) DEVICE — ENDO CONNECTOR ENDOGATOR AUX WATER JET FOR OLYMPUS SCOPE

## (undated) DEVICE — WIPE PREMOIST CLEANSING WASHCLOTHS 7988

## (undated) DEVICE — ENDO CAP AND TUBING STERILE FOR ENDOGATOR  100130